# Patient Record
Sex: FEMALE | Race: WHITE | NOT HISPANIC OR LATINO | Employment: UNEMPLOYED | URBAN - METROPOLITAN AREA
[De-identification: names, ages, dates, MRNs, and addresses within clinical notes are randomized per-mention and may not be internally consistent; named-entity substitution may affect disease eponyms.]

---
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
## 2020-01-06 ENCOUNTER — OFFICE VISIT (OUTPATIENT)
Dept: OBSTETRICS AND GYNECOLOGY | Facility: CLINIC | Age: 20
End: 2020-01-06

## 2020-01-06 VITALS
BODY MASS INDEX: 25.67 KG/M2 | DIASTOLIC BLOOD PRESSURE: 80 MMHG | WEIGHT: 154.1 LBS | SYSTOLIC BLOOD PRESSURE: 120 MMHG | HEIGHT: 65 IN

## 2020-01-06 DIAGNOSIS — Z01.419 ENCOUNTER FOR GYNECOLOGICAL EXAMINATION: Primary | ICD-10-CM

## 2020-01-06 DIAGNOSIS — Z23 NEED FOR HPV VACCINATION: ICD-10-CM

## 2020-01-06 DIAGNOSIS — Z13.9 SCREENING FOR CONDITION: ICD-10-CM

## 2020-01-06 DIAGNOSIS — K62.89 ANAL OR RECTAL PAIN: ICD-10-CM

## 2020-01-06 PROBLEM — Z30.011 ORAL CONTRACEPTION INITIAL PRESCRIPTION: Status: RESOLVED | Noted: 2017-06-05 | Resolved: 2020-01-06

## 2020-01-06 LAB
BILIRUB BLD-MCNC: NEGATIVE MG/DL
CLARITY, POC: CLEAR
COLOR UR: YELLOW
GLUCOSE UR STRIP-MCNC: NEGATIVE MG/DL
KETONES UR QL: NEGATIVE
LEUKOCYTE EST, POC: NEGATIVE
NITRITE UR-MCNC: NEGATIVE MG/ML
PH UR: 5 [PH] (ref 5–8)
PROT UR STRIP-MCNC: NEGATIVE MG/DL
RBC # UR STRIP: NEGATIVE /UL
SP GR UR: 1 (ref 1–1.03)
UROBILINOGEN UR QL: NORMAL

## 2020-01-06 PROCEDURE — 81002 URINALYSIS NONAUTO W/O SCOPE: CPT | Performed by: OBSTETRICS & GYNECOLOGY

## 2020-01-06 PROCEDURE — 99395 PREV VISIT EST AGE 18-39: CPT | Performed by: OBSTETRICS & GYNECOLOGY

## 2020-01-06 PROCEDURE — 90471 IMMUNIZATION ADMIN: CPT | Performed by: OBSTETRICS & GYNECOLOGY

## 2020-01-06 PROCEDURE — 90651 9VHPV VACCINE 2/3 DOSE IM: CPT | Performed by: OBSTETRICS & GYNECOLOGY

## 2020-01-06 RX ORDER — LEVONORGESTREL AND ETHINYL ESTRADIOL 0.1-0.02MG
1 KIT ORAL DAILY
Qty: 84 TABLET | Refills: 3 | Status: SHIPPED | OUTPATIENT
Start: 2020-01-06 | End: 2021-01-28 | Stop reason: SDUPTHER

## 2020-01-06 NOTE — PROGRESS NOTES
GYN Annual Exam     CC- Here for annual exam.     Maryam Alvarez is a 19 y.o. female who presents for annual well woman exam. Periods are regular every 28-30 days, lasting 5 days. Dysmenorrhea:none. Cyclic symptoms include none. No intermenstrual bleeding, spotting, or discharge.  Patient is sexually active  no . Patient is satisfied with her contraception yes - OCPs. Started for irregualr menstrual cycles that have now resolved. Pt compalning of rectal pain that she noticed 2 weeks ago. The pain has improved but she notices a bump in the area that is tender. Denies constipation. No BRBPR    OB History    None           Current contraception: OCP (estrogen/progesterone)  History of abnormal Pap smear: no  History of abnormal mammogram: no  Family history of uterine, colon or ovarian cancer: no  Family history of breast cancer: no    Health Maintenance   Topic Date Due   • ANNUAL PHYSICAL  10/13/2003   • HPV VACCINES (1 - Female 2-dose series) 10/13/2011   • TDAP/TD VACCINES (1 - Tdap) 10/13/2011   • CHLAMYDIA SCREENING  05/18/2017   • INFLUENZA VACCINE  08/01/2019   • MENINGOCOCCAL VACCINE (Normal Risk)  Completed       History reviewed. No pertinent past medical history.    History reviewed. No pertinent surgical history.      Current Outpatient Medications:   •  hydrocortisone (ANUSOL-HC) 2.5 % rectal cream, Apply rectally 2 times daily x 7 days, Disp: 30 g, Rfl: 0  •  levonorgestrel-ethinyl estradiol (LARISSIA) 0.1-20 MG-MCG per tablet, Take 1 tablet by mouth Daily., Disp: 84 tablet, Rfl: 3  No current facility-administered medications for this visit.     No Known Allergies    Social History     Tobacco Use   • Smoking status: Never Smoker   • Smokeless tobacco: Never Used   Substance Use Topics   • Alcohol use: No   • Drug use: No       Family History   Problem Relation Age of Onset   • No Known Problems Mother    • No Known Problems Father    • Multiple sclerosis Maternal Grandmother    • Meniere's disease  "Maternal Grandmother    • No Known Problems Maternal Grandfather    • No Known Problems Paternal Grandmother    • No Known Problems Paternal Grandfather        Review of Systems   Constitutional: Negative for appetite change, chills, fatigue, fever and unexpected weight change.   Gastrointestinal: Negative for abdominal distention, abdominal pain, anal bleeding, blood in stool, constipation, diarrhea, nausea and vomiting.   Genitourinary: Negative for dyspareunia, dysuria, menstrual problem, pelvic pain, vaginal bleeding, vaginal discharge and vaginal pain.       /80   Ht 165.1 cm (65\")   Wt 69.9 kg (154 lb 1.6 oz)   LMP 12/30/2019 (Exact Date)   Breastfeeding No   BMI 25.64 kg/m²     Physical Exam   Constitutional: She is oriented to person, place, and time. She appears well-developed and well-nourished.   HENT:   Mouth/Throat: Normal dentition. No dental caries.   Cardiovascular: Normal rate, regular rhythm and normal heart sounds.   Pulmonary/Chest: Effort normal and breath sounds normal. No stridor. No respiratory distress. She has no wheezes.   Abdominal: Soft. She exhibits no distension and no mass. There is no tenderness.   Genitourinary: Rectal exam shows no external hemorrhoid, no fissure and no tenderness.   Musculoskeletal: Normal range of motion. She exhibits no edema or tenderness.   Neurological: She is alert and oriented to person, place, and time. No cranial nerve deficit. Coordination normal.   Skin: Skin is warm. No rash noted. No erythema. No pallor.   Psychiatric: She has a normal mood and affect. Her behavior is normal. Judgment and thought content normal.   Vitals reviewed.         Assessment/Plan    1) GYN HM: check pap age 21. gardisil #1 today. FU for Gardisil 32 in 2 months.  2) STD screening: never had intercourse  3) Irregular menstrual cycles: resolved on OCps- refills given.  4) anal lesion/pain: no evidence of fissure or hemorrhoid. Anal skin tag only. Rx Anusol cream to " apply to anus bid x 7 days.  5) Diet and Exercise discussed  6) Smoking Status: nonsmoker  7) Social: pt's mother is also my pt.  8) Follow up prn and 1 year       Diagnoses and all orders for this visit:    Encounter for gynecological examination    Screening for condition  -     POC Urinalysis Dipstick    Need for HPV vaccination  -     HPV 9-Valent Recomb Vaccine suspension 0.5 mL    Anal or rectal pain    Other orders  -     hydrocortisone (ANUSOL-HC) 2.5 % rectal cream; Apply rectally 2 times daily x 7 days  -     levonorgestrel-ethinyl estradiol (LARISSIA) 0.1-20 MG-MCG per tablet; Take 1 tablet by mouth Daily.  -     Cancel: HPV Vaccine QuadriValent 3 Dose IM          Hermila Bangura DO  1/6/2020  6:51 PM

## 2020-08-20 ENCOUNTER — TELEPHONE (OUTPATIENT)
Dept: OBSTETRICS AND GYNECOLOGY | Facility: CLINIC | Age: 20
End: 2020-08-20

## 2020-08-20 RX ORDER — FLUCONAZOLE 150 MG/1
150 TABLET ORAL DAILY
Qty: 1 TABLET | Refills: 0 | Status: SHIPPED | OUTPATIENT
Start: 2020-08-20 | End: 2021-01-28

## 2020-08-20 NOTE — TELEPHONE ENCOUNTER
PT HAD AN APPOINTMENT TODAY TO GET TREATMENT FOR A YEAST INFECTION AND SHE WOKE UP NOT FEELING WELL AND DOESN'T THINK IT WOULD BE A GOOD IDEA TO COME INTO THE OFFICE SHE WANTS TO KNOW IF YOU COULD SEND IN A DIFLUCAN FOR HER?

## 2021-01-28 ENCOUNTER — OFFICE VISIT (OUTPATIENT)
Dept: OBSTETRICS AND GYNECOLOGY | Facility: CLINIC | Age: 21
End: 2021-01-28

## 2021-01-28 VITALS
SYSTOLIC BLOOD PRESSURE: 122 MMHG | HEIGHT: 64 IN | BODY MASS INDEX: 28.34 KG/M2 | DIASTOLIC BLOOD PRESSURE: 76 MMHG | WEIGHT: 166 LBS

## 2021-01-28 DIAGNOSIS — Z13.9 SCREENING FOR CONDITION: ICD-10-CM

## 2021-01-28 DIAGNOSIS — Z23 NEED FOR HPV VACCINE: ICD-10-CM

## 2021-01-28 DIAGNOSIS — N89.8 VAGINAL DISCHARGE: ICD-10-CM

## 2021-01-28 DIAGNOSIS — Z11.3 SCREENING EXAMINATION FOR STD (SEXUALLY TRANSMITTED DISEASE): ICD-10-CM

## 2021-01-28 DIAGNOSIS — Z01.419 ENCOUNTER FOR GYNECOLOGICAL EXAMINATION: Primary | ICD-10-CM

## 2021-01-28 LAB
B-HCG UR QL: NEGATIVE
BILIRUB BLD-MCNC: NEGATIVE MG/DL
CLARITY, POC: CLEAR
COLOR UR: YELLOW
GLUCOSE UR STRIP-MCNC: NEGATIVE MG/DL
INTERNAL NEGATIVE CONTROL: NEGATIVE
INTERNAL POSITIVE CONTROL: POSITIVE
KETONES UR QL: NEGATIVE
LEUKOCYTE EST, POC: NEGATIVE
Lab: NORMAL
NITRITE UR-MCNC: NEGATIVE MG/ML
PH UR: 6 [PH] (ref 5–8)
PROT UR STRIP-MCNC: NEGATIVE MG/DL
RBC # UR STRIP: ABNORMAL /UL
SP GR UR: 1.02 (ref 1–1.03)
UROBILINOGEN UR QL: NORMAL

## 2021-01-28 PROCEDURE — 90471 IMMUNIZATION ADMIN: CPT | Performed by: OBSTETRICS & GYNECOLOGY

## 2021-01-28 PROCEDURE — 90651 9VHPV VACCINE 2/3 DOSE IM: CPT | Performed by: OBSTETRICS & GYNECOLOGY

## 2021-01-28 PROCEDURE — 99213 OFFICE O/P EST LOW 20 MIN: CPT | Performed by: OBSTETRICS & GYNECOLOGY

## 2021-01-28 PROCEDURE — 81025 URINE PREGNANCY TEST: CPT | Performed by: OBSTETRICS & GYNECOLOGY

## 2021-01-28 PROCEDURE — 99395 PREV VISIT EST AGE 18-39: CPT | Performed by: OBSTETRICS & GYNECOLOGY

## 2021-01-28 PROCEDURE — 81002 URINALYSIS NONAUTO W/O SCOPE: CPT | Performed by: OBSTETRICS & GYNECOLOGY

## 2021-01-28 RX ORDER — FLUCONAZOLE 150 MG/1
150 TABLET ORAL DAILY
Qty: 1 TABLET | Refills: 0 | Status: SHIPPED | OUTPATIENT
Start: 2021-01-28 | End: 2021-06-14

## 2021-01-28 RX ORDER — LEVONORGESTREL AND ETHINYL ESTRADIOL 0.1-0.02MG
1 KIT ORAL DAILY
Qty: 84 TABLET | Refills: 3 | Status: SHIPPED | OUTPATIENT
Start: 2021-01-28 | End: 2021-03-18

## 2021-01-28 NOTE — PROGRESS NOTES
GYN Annual Exam     CC- Here for annual exam.     Maryam Alvarez is a 20 y.o. female who presents for annual well woman exam. Pt complaining of vaginal irritation. She took Diflucan and sx improved but now symptoms back for 3 months. Yellowish vaginal discharge. Pt  Reports pruitis on the outside of her vagina. Pt reports she has tried to have sex with her boyfriend and it burns. She has tried a lubricant but it does not help. The burning is deep in the vagina and not at introitus.  Pt on OCps for contraception. Cycles are regular and lasting 5 days. Her boyfriend has never had sex prior to her.    OB History    No obstetric history on file.         Current contraception: OCP (estrogen/progesterone)  History of abnormal Pap smear: no  History of abnormal mammogram: no  Family history of uterine, colon or ovarian cancer: no  Family history of breast cancer: no    Health Maintenance   Topic Date Due   • ANNUAL PHYSICAL  10/13/2003   • HEPATITIS C SCREENING  05/18/2017   • CHLAMYDIA SCREENING  05/18/2017   • TDAP/TD VACCINES (1 - Tdap) 10/13/2019   • HPV VACCINES (2 - 3-dose series) 02/03/2020   • INFLUENZA VACCINE  08/01/2020   • MENINGOCOCCAL VACCINE  Completed   • Pneumococcal Vaccine 0-64  Aged Out       History reviewed. No pertinent past medical history.    History reviewed. No pertinent surgical history.      Current Outpatient Medications:   •  levonorgestrel-ethinyl estradiol (Larissia) 0.1-20 MG-MCG per tablet, Take 1 tablet by mouth Daily., Disp: 84 tablet, Rfl: 3  •  fluconazole (Diflucan) 150 MG tablet, Take 1 tablet by mouth Daily., Disp: 1 tablet, Rfl: 0  No current facility-administered medications for this visit.     No Known Allergies    Social History     Tobacco Use   • Smoking status: Never Smoker   • Smokeless tobacco: Never Used   Substance Use Topics   • Alcohol use: No   • Drug use: No       Family History   Problem Relation Age of Onset   • No Known Problems Mother    • No Known Problems Father   "  • Multiple sclerosis Maternal Grandmother    • Meniere's disease Maternal Grandmother    • No Known Problems Maternal Grandfather    • No Known Problems Paternal Grandmother    • No Known Problems Paternal Grandfather        Review of Systems   Constitutional: Negative for appetite change, chills, fatigue, fever and unexpected weight change.   Gastrointestinal: Negative for abdominal distention, abdominal pain, anal bleeding, blood in stool, constipation, diarrhea, nausea and vomiting.   Genitourinary: Negative for dyspareunia, dysuria, menstrual problem, pelvic pain, vaginal bleeding, vaginal discharge and vaginal pain.       /76   Ht 162.6 cm (64\")   Wt 75.3 kg (166 lb)   LMP 01/27/2021   BMI 28.49 kg/m²     Physical Exam  Vitals signs reviewed.   Constitutional:       General: She is not in acute distress.     Appearance: She is well-developed. She is not diaphoretic.   HENT:      Mouth/Throat:      Dentition: Normal dentition. No dental caries.   Cardiovascular:      Rate and Rhythm: Normal rate and regular rhythm.      Heart sounds: Normal heart sounds.   Pulmonary:      Effort: Pulmonary effort is normal. No respiratory distress.      Breath sounds: Normal breath sounds. No stridor. No wheezing.   Chest:      Breasts:         Right: No inverted nipple, mass, nipple discharge, skin change or tenderness.         Left: No inverted nipple, mass, nipple discharge, skin change or tenderness.   Abdominal:      General: There is no distension.      Palpations: Abdomen is soft. There is no mass.      Tenderness: There is no abdominal tenderness.   Genitourinary:     Labia:         Right: No rash, tenderness, lesion or injury.         Left: No rash, tenderness, lesion or injury.       Vagina: No signs of injury and foreign body. No vaginal discharge, erythema, tenderness or bleeding.      Cervix: No cervical motion tenderness, discharge or friability.      Uterus: Not deviated, not enlarged, not fixed and " not tender.       Adnexa:         Right: No mass, tenderness or fullness.          Left: No mass, tenderness or fullness.     Musculoskeletal: Normal range of motion.         General: No tenderness.   Skin:     General: Skin is warm.      Coloration: Skin is not pale.      Findings: No erythema or rash.   Neurological:      Mental Status: She is alert and oriented to person, place, and time.      Cranial Nerves: No cranial nerve deficit.      Coordination: Coordination normal.   Psychiatric:         Behavior: Behavior normal.         Thought Content: Thought content normal.         Judgment: Judgment normal.            Assessment/Plan    1) GYN HM: Check pap smear at age 21. SBE demonstrated and encouraged. S/p Gardisil #1. Pt desires to complete series. Gardisil #2 today.  2) STD screening: Check GCCT. Declines other STDs  3) Contraception: OCPs- refills given  4) Vulvovaginitis: Rx Diflucan. Check culture.  5) Diet and Exercise discussed  6) Smoking Status: nonsmoker  7) Social: pt planning on going to college but not sure what she wants to study.  8) Follow up prn and 1 year       Diagnoses and all orders for this visit:    Encounter for gynecological examination    Screening for condition  -     POC Urinalysis Dipstick  -     POC Pregnancy, Urine    Screening examination for STD (sexually transmitted disease)  -     Cancel: Chlamydia trachomatis, Neisseria gonorrhoeae, Trichomonas vaginalis, PCR - Urine, Urine, Random Void    Need for HPV vaccine  -     HPV 9-Valent Recomb Vaccine suspension 0.5 mL    Vaginal discharge  -     NuSwab VG+ - Swab, Vagina  -     Genital Mycoplasmas KHADAR, Swab - Swab, Vagina    Other orders  -     levonorgestrel-ethinyl estradiol (Larissia) 0.1-20 MG-MCG per tablet; Take 1 tablet by mouth Daily.  -     fluconazole (Diflucan) 150 MG tablet; Take 1 tablet by mouth Daily.          Hermila Bangura, DO  1/28/2021  13:39 EST

## 2021-02-03 LAB
A VAGINAE DNA VAG QL NAA+PROBE: NORMAL SCORE
BVAB2 DNA VAG QL NAA+PROBE: NORMAL SCORE
C ALBICANS DNA VAG QL NAA+PROBE: NEGATIVE
C GLABRATA DNA VAG QL NAA+PROBE: NEGATIVE
C TRACH DNA VAG QL NAA+PROBE: NEGATIVE
M GENITALIUM DNA SPEC QL NAA+PROBE: NEGATIVE
M HOMINIS DNA SPEC QL NAA+PROBE: NEGATIVE
MEGA1 DNA VAG QL NAA+PROBE: NORMAL SCORE
N GONORRHOEA DNA VAG QL NAA+PROBE: NEGATIVE
T VAGINALIS DNA VAG QL NAA+PROBE: NEGATIVE
UREAPLASMA DNA SPEC QL NAA+PROBE: POSITIVE

## 2021-02-03 RX ORDER — AZITHROMYCIN 500 MG/1
1000 TABLET, FILM COATED ORAL DAILY
Qty: 2 TABLET | Refills: 0 | Status: SHIPPED | OUTPATIENT
Start: 2021-02-03 | End: 2021-02-04

## 2021-03-15 ENCOUNTER — TELEPHONE (OUTPATIENT)
Dept: OBSTETRICS AND GYNECOLOGY | Facility: CLINIC | Age: 21
End: 2021-03-15

## 2021-03-15 NOTE — TELEPHONE ENCOUNTER
PATIENT IS HAVING BREAKTHROUGH BLEEDING AND STATES IT'S HEAVY. SHE IS WANTIING TO SEE IF THAT'S NORMAL OR IF SHE SHOULD COME IN AND DISCUSS IT WITH YOU?

## 2021-03-16 NOTE — TELEPHONE ENCOUNTER
She can keep taking the pill daily... the bleeding should stop. Or I can put her on a higher dose pill

## 2021-03-18 RX ORDER — LEVONORGESTREL AND ETHINYL ESTRADIOL 0.15-0.03
1 KIT ORAL DAILY
Qty: 84 TABLET | Refills: 3 | Status: SHIPPED | OUTPATIENT
Start: 2021-03-18 | End: 2021-06-14

## 2021-03-18 NOTE — TELEPHONE ENCOUNTER
I called in the higher dosage. She should finish her current pill and then start the new pack of higher dosage.

## 2021-03-19 ENCOUNTER — TELEPHONE (OUTPATIENT)
Dept: OBSTETRICS AND GYNECOLOGY | Facility: CLINIC | Age: 21
End: 2021-03-19

## 2021-03-19 NOTE — TELEPHONE ENCOUNTER
Patient called in stating she was having heavy bleeding in between periods and you started her on ocp's. She wants to know if she skipped the placebo and started new pack if that would be ok? Please advise

## 2021-04-12 ENCOUNTER — IMMUNIZATION (OUTPATIENT)
Dept: VACCINE CLINIC | Facility: HOSPITAL | Age: 21
End: 2021-04-12

## 2021-04-12 PROCEDURE — 0001A: CPT | Performed by: OBSTETRICS & GYNECOLOGY

## 2021-04-12 PROCEDURE — 91300 HC SARSCOV02 VAC 30MCG/0.3ML IM: CPT | Performed by: OBSTETRICS & GYNECOLOGY

## 2021-04-15 ENCOUNTER — APPOINTMENT (OUTPATIENT)
Dept: VACCINE CLINIC | Facility: HOSPITAL | Age: 21
End: 2021-04-15

## 2021-05-03 ENCOUNTER — IMMUNIZATION (OUTPATIENT)
Dept: VACCINE CLINIC | Facility: HOSPITAL | Age: 21
End: 2021-05-03

## 2021-05-03 PROCEDURE — 91300 HC SARSCOV02 VAC 30MCG/0.3ML IM: CPT | Performed by: OBSTETRICS & GYNECOLOGY

## 2021-05-03 PROCEDURE — 0002A: CPT | Performed by: OBSTETRICS & GYNECOLOGY

## 2021-05-24 ENCOUNTER — CLINICAL SUPPORT (OUTPATIENT)
Dept: OBSTETRICS AND GYNECOLOGY | Facility: CLINIC | Age: 21
End: 2021-05-24

## 2021-05-24 DIAGNOSIS — Z23 NEED FOR HPV VACCINE: Primary | ICD-10-CM

## 2021-05-24 PROCEDURE — 90651 9VHPV VACCINE 2/3 DOSE IM: CPT | Performed by: OBSTETRICS & GYNECOLOGY

## 2021-05-24 PROCEDURE — 90471 IMMUNIZATION ADMIN: CPT | Performed by: OBSTETRICS & GYNECOLOGY

## 2021-06-14 ENCOUNTER — OFFICE VISIT (OUTPATIENT)
Dept: OBSTETRICS AND GYNECOLOGY | Facility: CLINIC | Age: 21
End: 2021-06-14

## 2021-06-14 ENCOUNTER — TELEPHONE (OUTPATIENT)
Dept: OBSTETRICS AND GYNECOLOGY | Facility: CLINIC | Age: 21
End: 2021-06-14

## 2021-06-14 VITALS
WEIGHT: 157 LBS | HEIGHT: 64 IN | DIASTOLIC BLOOD PRESSURE: 72 MMHG | SYSTOLIC BLOOD PRESSURE: 130 MMHG | BODY MASS INDEX: 26.8 KG/M2

## 2021-06-14 DIAGNOSIS — N89.8 VAGINAL ITCHING: ICD-10-CM

## 2021-06-14 DIAGNOSIS — N76.0 ACUTE VAGINITIS: Primary | ICD-10-CM

## 2021-06-14 DIAGNOSIS — Z13.89 SCREENING FOR GENITOURINARY CONDITION: ICD-10-CM

## 2021-06-14 PROCEDURE — 99213 OFFICE O/P EST LOW 20 MIN: CPT | Performed by: NURSE PRACTITIONER

## 2021-06-14 RX ORDER — AZITHROMYCIN 250 MG/1
TABLET, FILM COATED ORAL
Qty: 6 TABLET | Refills: 0 | Status: SHIPPED | OUTPATIENT
Start: 2021-06-14 | End: 2022-04-11

## 2021-06-14 RX ORDER — LEVONORGESTREL AND ETHINYL ESTRADIOL 0.1-0.02MG
1 KIT ORAL DAILY
COMMUNITY
Start: 2021-05-16 | End: 2022-03-23

## 2021-06-14 NOTE — PROGRESS NOTES
"Subjective     Chief Complaint   Patient presents with   • Vaginal Itching     reddness, irritation       Maryam Alvarez is a 20 y.o. No obstetric history on file. whose LMP is Patient's last menstrual period was 05/15/2021.. She presents today with complaints of vaginal irritation, itching and redness. Her symptoms started approx 1 month ago. She has not tried any OTC medications. Earlier this year in January she had ureaplasma and she feels like that she has the exact same symptoms.  She reports a yellowish discharge. She is not sexually active.  This problem is new to me, the examiner.   HPI    HPI    The following portions of the patient's history were reviewed and updated as appropriate:vital signs, allergies, current medications, past medical history, past social history, past surgical history and problem list      Review of Systems     Review of Systems   Constitutional: Negative.    Gastrointestinal: Negative.    Genitourinary: Positive for vaginal discharge and vaginal pain.   Musculoskeletal: Negative.    Skin: Negative.    Psychiatric/Behavioral: Negative.        Objective      /72   Ht 162.6 cm (64\")   Wt 71.2 kg (157 lb)   LMP 05/15/2021   BMI 26.95 kg/m²     Physical Exam    Physical Exam  Vitals reviewed.   Constitutional:       Appearance: She is well-developed.   Abdominal:      Palpations: Abdomen is soft.   Genitourinary:     Exam position: Supine.      Labia:         Right: No rash, tenderness, lesion or injury.         Left: No rash, tenderness, lesion or injury.       Vagina: No signs of injury and foreign body. Vaginal discharge present. No erythema, tenderness or bleeding.   Skin:     General: Skin is warm and dry.   Neurological:      General: No focal deficit present.      Mental Status: She is alert and oriented to person, place, and time.   Psychiatric:         Mood and Affect: Mood normal.         Behavior: Behavior normal.         Lab Review   Labs: Urinalysis - with micro "     Imaging   No data reviewed    Assessment  Diagnoses and all orders for this visit:    1. Screening for genitourinary condition (Primary)  -     POC Urinalysis Dipstick        Additional Assessment:   1. Vaginitis    Plan     1. Vaginitis- Suspect ureaplasma again as pt had similar complaints with confirmed NuSwab in 1/21. Plan to treat presumptively for ureaplasma. She understands treatment before her swab results may necessitate an alternative treatment in the near future. Disc vag  Health. Enc vag probiotics daily.   2. Scheduled for: STD Labs - Nu Swab - myco, yeast, bv, leuk , Ultrasound of the -  N/A , Mammography - N/A , Bone Density Test - N/A , Additional Labs - N/A  3. STD:  Enc condom use.   4. Smoking status: non smoker  5.   BMI: Patient's Body mass index is 26.95 kg/m².      RTO PRN     Kristie Maya, APRN  6/14/2021

## 2021-07-07 ENCOUNTER — TELEPHONE (OUTPATIENT)
Dept: OBSTETRICS AND GYNECOLOGY | Facility: CLINIC | Age: 21
End: 2021-07-07

## 2021-07-07 NOTE — TELEPHONE ENCOUNTER
Patient calling states she saw Kristie back on 6/14 and tested positive for ureaplasma. States she is still having some vaginal irration and would like to know if you would call something in for her?

## 2021-07-08 RX ORDER — DOXYCYCLINE 100 MG/1
100 TABLET ORAL 2 TIMES DAILY
Qty: 14 TABLET | Refills: 0 | Status: SHIPPED | OUTPATIENT
Start: 2021-07-08 | End: 2021-07-15

## 2021-07-08 NOTE — TELEPHONE ENCOUNTER
I sent in meds. But let her know that if she still has symptoms that she needs to come in and get swabbed.

## 2021-07-08 NOTE — TELEPHONE ENCOUNTER
Patient calling states she cant really afford to come to come back in and is sure she has the ureaplasma would like to know if you will just call in another round of antibiotics for her?

## 2021-10-23 ENCOUNTER — HOSPITAL ENCOUNTER (EMERGENCY)
Facility: HOSPITAL | Age: 21
Discharge: HOME | End: 2021-10-23
Attending: EMERGENCY MEDICINE | Admitting: EMERGENCY MEDICINE

## 2021-10-23 VITALS
DIASTOLIC BLOOD PRESSURE: 77 MMHG | HEART RATE: 90 BPM | BODY MASS INDEX: 28.28 KG/M2 | WEIGHT: 176 LBS | HEIGHT: 66 IN | OXYGEN SATURATION: 98 % | SYSTOLIC BLOOD PRESSURE: 119 MMHG | RESPIRATION RATE: 18 BRPM | TEMPERATURE: 97.9 F

## 2021-10-23 DIAGNOSIS — R00.0 TACHYCARDIA: Primary | ICD-10-CM

## 2021-10-23 LAB
ALBUMIN SERPL-MCNC: 4.3 G/DL (ref 3.5–5.2)
ALBUMIN/GLOB SERPL: 1.3 G/DL
ALP SERPL-CCNC: 68 U/L (ref 39–117)
ALT SERPL W P-5'-P-CCNC: 17 U/L (ref 1–33)
ANION GAP SERPL CALCULATED.3IONS-SCNC: 9.1 MMOL/L (ref 5–15)
AST SERPL-CCNC: 25 U/L (ref 1–32)
BASOPHILS # BLD AUTO: 0.05 10*3/MM3 (ref 0–0.2)
BASOPHILS NFR BLD AUTO: 0.8 % (ref 0–1.5)
BILIRUB SERPL-MCNC: 0.2 MG/DL (ref 0–1.2)
BUN SERPL-MCNC: 10 MG/DL (ref 6–20)
BUN/CREAT SERPL: 13.5 (ref 7–25)
CALCIUM SPEC-SCNC: 9.3 MG/DL (ref 8.6–10.5)
CHLORIDE SERPL-SCNC: 104 MMOL/L (ref 98–107)
CO2 SERPL-SCNC: 24.9 MMOL/L (ref 22–29)
CREAT SERPL-MCNC: 0.74 MG/DL (ref 0.57–1)
D DIMER PPP FEU-MCNC: 0.33 MCGFEU/ML (ref 0–0.46)
DEPRECATED RDW RBC AUTO: 39.1 FL (ref 37–54)
EOSINOPHIL # BLD AUTO: 0.17 10*3/MM3 (ref 0–0.4)
EOSINOPHIL NFR BLD AUTO: 2.7 % (ref 0.3–6.2)
ERYTHROCYTE [DISTWIDTH] IN BLOOD BY AUTOMATED COUNT: 12.2 % (ref 12.3–15.4)
GFR SERPL CREATININE-BSD FRML MDRD: 99 ML/MIN/1.73
GLOBULIN UR ELPH-MCNC: 3.2 GM/DL
GLUCOSE SERPL-MCNC: 112 MG/DL (ref 65–99)
HCT VFR BLD AUTO: 38.8 % (ref 34–46.6)
HGB BLD-MCNC: 13 G/DL (ref 12–15.9)
IMM GRANULOCYTES # BLD AUTO: 0.01 10*3/MM3 (ref 0–0.05)
IMM GRANULOCYTES NFR BLD AUTO: 0.2 % (ref 0–0.5)
LYMPHOCYTES # BLD AUTO: 1.79 10*3/MM3 (ref 0.7–3.1)
LYMPHOCYTES NFR BLD AUTO: 28.9 % (ref 19.6–45.3)
MCH RBC QN AUTO: 29.4 PG (ref 26.6–33)
MCHC RBC AUTO-ENTMCNC: 33.5 G/DL (ref 31.5–35.7)
MCV RBC AUTO: 87.8 FL (ref 79–97)
MONOCYTES # BLD AUTO: 0.4 10*3/MM3 (ref 0.1–0.9)
MONOCYTES NFR BLD AUTO: 6.5 % (ref 5–12)
NEUTROPHILS NFR BLD AUTO: 3.77 10*3/MM3 (ref 1.7–7)
NEUTROPHILS NFR BLD AUTO: 60.9 % (ref 42.7–76)
NRBC BLD AUTO-RTO: 0 /100 WBC (ref 0–0.2)
PLATELET # BLD AUTO: 210 10*3/MM3 (ref 140–450)
PMV BLD AUTO: 10.9 FL (ref 6–12)
POTASSIUM SERPL-SCNC: 3.7 MMOL/L (ref 3.5–5.2)
PROT SERPL-MCNC: 7.5 G/DL (ref 6–8.5)
RBC # BLD AUTO: 4.42 10*6/MM3 (ref 3.77–5.28)
SODIUM SERPL-SCNC: 138 MMOL/L (ref 136–145)
TROPONIN T SERPL-MCNC: <0.01 NG/ML (ref 0–0.03)
WBC # BLD AUTO: 6.19 10*3/MM3 (ref 3.4–10.8)

## 2021-10-23 PROCEDURE — 99283 EMERGENCY DEPT VISIT LOW MDM: CPT

## 2021-10-23 PROCEDURE — 85025 COMPLETE CBC W/AUTO DIFF WBC: CPT

## 2021-10-23 PROCEDURE — 93010 ELECTROCARDIOGRAM REPORT: CPT | Performed by: INTERNAL MEDICINE

## 2021-10-23 PROCEDURE — 93005 ELECTROCARDIOGRAM TRACING: CPT | Performed by: EMERGENCY MEDICINE

## 2021-10-23 PROCEDURE — 85379 FIBRIN DEGRADATION QUANT: CPT

## 2021-10-23 PROCEDURE — 84484 ASSAY OF TROPONIN QUANT: CPT

## 2021-10-23 PROCEDURE — 80053 COMPREHEN METABOLIC PANEL: CPT

## 2021-10-23 RX ORDER — SODIUM CHLORIDE 0.9 % (FLUSH) 0.9 %
10 SYRINGE (ML) INJECTION AS NEEDED
Status: DISCONTINUED | OUTPATIENT
Start: 2021-10-23 | End: 2021-10-24 | Stop reason: HOSPADM

## 2021-10-23 RX ADMIN — SODIUM CHLORIDE 1000 ML: 9 INJECTION, SOLUTION INTRAVENOUS at 21:51

## 2021-10-24 LAB — QT INTERVAL: 330 MS

## 2021-10-24 NOTE — ED PROVIDER NOTES
EMERGENCY DEPARTMENT ENCOUNTER      Room Number: 08/08    History is provided by the patient, no translation services needed    HPI:    Chief complaint: Chest pain    Location: Left side    Quality/Severity: Patient describes the pain is a burning pain that feels like heartburn.  She rates the pain a 2 out of 10.    Timing/Duration: 4 days    Modifying Factors: Patient states that she took Pepcid earlier this date and did not notice the pain for several hours after taking it.    Associated Symptoms: None    Narrative: Pt is a 21 y.o. female who presents complaining of left-sided chest pain x4 days.  She was seen at urgent care and sent to the hospital for further evaluation.  Patient is on oral contraceptives.  She describes the pain as a burning pain that feels like heartburn.  She rates the pain a 2 out of 10 at this time.  Patient states that she took Pepcid earlier this date and did not notice the pain for several hours after taking it.  Patient denies nausea, vomiting, diarrhea, shortness of breath, back pain, neck pain, flank pain, dysuria, abdominal pain, or extremity pain.      PMD: Provider, No Known    REVIEW OF SYSTEMS  Review of Systems   Constitutional: Negative for chills, fatigue and fever.   HENT: Negative for congestion and rhinorrhea.    Eyes: Negative for pain and visual disturbance.   Respiratory: Negative for cough, chest tightness and shortness of breath.    Cardiovascular: Positive for chest pain (left sided; burning chest pain). Negative for leg swelling.   Gastrointestinal: Negative for abdominal pain, constipation, diarrhea, nausea and vomiting.   Genitourinary: Negative for dysuria and flank pain.   Musculoskeletal: Negative for arthralgias, back pain, gait problem, myalgias and neck pain.   Skin: Negative for color change, pallor, rash and wound.   Neurological: Negative for dizziness, syncope, light-headedness and headaches.   Psychiatric/Behavioral: Negative for confusion and suicidal  ideas. The patient is not nervous/anxious.          PAST MEDICAL HISTORY  Active Ambulatory Problems     Diagnosis Date Noted   • Oligomenorrhea 05/19/2017   • Sorethroat 10/23/2017   • Fever 10/23/2017   • Headache 10/23/2017   • Acute vaginitis 06/14/2021     Resolved Ambulatory Problems     Diagnosis Date Noted   • Oral contraception initial prescription 06/05/2017   • Congestion of both ears 07/19/2017     No Additional Past Medical History       PAST SURGICAL HISTORY  History reviewed. No pertinent surgical history.    FAMILY HISTORY  Family History   Problem Relation Age of Onset   • No Known Problems Mother    • No Known Problems Father    • Multiple sclerosis Maternal Grandmother    • Meniere's disease Maternal Grandmother    • No Known Problems Maternal Grandfather    • No Known Problems Paternal Grandmother    • No Known Problems Paternal Grandfather        SOCIAL HISTORY  Social History     Socioeconomic History   • Marital status: Single   Tobacco Use   • Smoking status: Never Smoker   • Smokeless tobacco: Never Used   Substance and Sexual Activity   • Alcohol use: No   • Drug use: No   • Sexual activity: Yes     Partners: Male     Birth control/protection: OCP       ALLERGIES  Patient has no known allergies.      Current Facility-Administered Medications:   •  [COMPLETED] Insert peripheral IV, , , Once **AND** sodium chloride 0.9 % flush 10 mL, 10 mL, Intravenous, PRN, Aparna Cervantes PA-C    Current Outpatient Medications:   •  azithromycin (Zithromax Z-Vaughn) 250 MG tablet, Take 2 tablets by mouth on day 1, then 1 tablet daily on days 2-5, Disp: 6 tablet, Rfl: 0  •  hydrocortisone 2.5 % ointment, APPLY TO AFFECTED AREA OF EXTREMITIES TWICE A DAY AS NEEDED, Disp: , Rfl:   •  Larissia 0.1-20 MG-MCG per tablet, Take 1 tablet by mouth Daily., Disp: , Rfl:     PHYSICAL EXAM  ED Triage Vitals [10/23/21 2127]   Temp Heart Rate Resp BP SpO2   97.9 °F (36.6 °C) (!) 125 20 139/91 100 %      Temp src Heart Rate  Source Patient Position BP Location FiO2 (%)   Oral Monitor Sitting Right arm --       Physical Exam  Vitals and nursing note reviewed.   Constitutional:       General: She is not in acute distress.     Appearance: She is well-developed. She is not ill-appearing, toxic-appearing or diaphoretic.   HENT:      Head: Normocephalic and atraumatic.   Eyes:      Conjunctiva/sclera: Conjunctivae normal.   Cardiovascular:      Rate and Rhythm: Regular rhythm. Tachycardia present.      Pulses:           Radial pulses are 2+ on the right side and 2+ on the left side.      Heart sounds: Normal heart sounds.   Pulmonary:      Effort: Pulmonary effort is normal. No tachypnea, accessory muscle usage or respiratory distress.      Breath sounds: Normal breath sounds. No decreased breath sounds, wheezing, rhonchi or rales.   Chest:      Chest wall: No tenderness.   Abdominal:      General: Bowel sounds are normal. There is no distension.      Palpations: Abdomen is soft.      Tenderness: There is no abdominal tenderness. There is no guarding or rebound.   Musculoskeletal:         General: Normal range of motion.      Cervical back: Normal range of motion and neck supple.      Right lower leg: No tenderness. No edema.      Left lower leg: No tenderness. No edema.   Skin:     General: Skin is warm and dry.      Coloration: Skin is not cyanotic or pale.      Findings: No ecchymosis, erythema or rash.      Nails: There is no clubbing.   Neurological:      General: No focal deficit present.      Mental Status: She is alert and oriented to person, place, and time.   Psychiatric:         Mood and Affect: Mood and affect normal.         Behavior: Behavior normal.           LAB RESULTS  Lab Results (last 24 hours)     Procedure Component Value Units Date/Time    CBC & Differential [[id number]]  (Abnormal) Collected: 10/23/21 2144    Specimen: Blood Updated: 10/23/21 2151    Narrative:      The following orders were created for panel order CBC  & Differential.  Procedure                               Abnormality         Status                     ---------                               -----------         ------                     CBC Auto Differential[[id number]]        Abnormal            Final result                 Please view results for these tests on the individual orders.    Comprehensive Metabolic Panel [[id number]]  (Abnormal) Collected: 10/23/21 2144    Specimen: Blood Updated: 10/23/21 2210     Glucose 112 mg/dL      BUN 10 mg/dL      Creatinine 0.74 mg/dL      Sodium 138 mmol/L      Potassium 3.7 mmol/L      Comment: Slight hemolysis detected by analyzer. Results may be affected.        Chloride 104 mmol/L      CO2 24.9 mmol/L      Calcium 9.3 mg/dL      Total Protein 7.5 g/dL      Albumin 4.30 g/dL      ALT (SGPT) 17 U/L      AST (SGOT) 25 U/L      Alkaline Phosphatase 68 U/L      Total Bilirubin 0.2 mg/dL      eGFR Non African Amer 99 mL/min/1.73      Globulin 3.2 gm/dL      A/G Ratio 1.3 g/dL      BUN/Creatinine Ratio 13.5     Anion Gap 9.1 mmol/L     Narrative:      GFR Normal >60  Chronic Kidney Disease <60  Kidney Failure <15      D-dimer, Quantitative [[id number]]  (Normal) Collected: 10/23/21 2144    Specimen: Blood Updated: 10/23/21 2203     D-Dimer, Quantitative 0.33 MCGFEU/mL     Narrative:      Can be elevated in, but is not diagnostic for deep vein thrombosis (DVT) or pulmonary embolis (PE).  It is also elevated in other medical conditions.  Clinical correlation is required.  The negative cut-off value for the D-Dimer is 0.50 mcg FEU/mL for DVT and PE.      Troponin [[id number]]  (Normal) Collected: 10/23/21 2144    Specimen: Blood Updated: 10/23/21 2212     Troponin T <0.010 ng/mL     Narrative:      Troponin T Reference Range:  <= 0.03 ng/mL-   Negative for AMI  >0.03 ng/mL-     Abnormal for myocardial necrosis.  Clinicians would have to utilize clinical acumen, EKG, Troponin and serial changes to determine if it is an Acute  Myocardial Infarction or myocardial injury due to an underlying chronic condition.       Results may be falsely decreased if patient taking Biotin.      CBC Auto Differential [[id number]]  (Abnormal) Collected: 10/23/21 2144    Specimen: Blood Updated: 10/23/21 2151     WBC 6.19 10*3/mm3      RBC 4.42 10*6/mm3      Hemoglobin 13.0 g/dL      Hematocrit 38.8 %      MCV 87.8 fL      MCH 29.4 pg      MCHC 33.5 g/dL      RDW 12.2 %      RDW-SD 39.1 fl      MPV 10.9 fL      Platelets 210 10*3/mm3      Neutrophil % 60.9 %      Lymphocyte % 28.9 %      Monocyte % 6.5 %      Eosinophil % 2.7 %      Basophil % 0.8 %      Immature Grans % 0.2 %      Neutrophils, Absolute 3.77 10*3/mm3      Lymphocytes, Absolute 1.79 10*3/mm3      Monocytes, Absolute 0.40 10*3/mm3      Eosinophils, Absolute 0.17 10*3/mm3      Basophils, Absolute 0.05 10*3/mm3      Immature Grans, Absolute 0.01 10*3/mm3      nRBC 0.0 /100 WBC             I ordered the above labs and reviewed the results    RADIOLOGY  No Radiology Exams Resulted Within Past 24 Hours      PROCEDURES  Procedures      PROGRESS AND CONSULTS  ED Course as of 10/23/21 2223   Sat Oct 23, 2021   2214 EKG         EKG time / Interpretation time: 2131/2211  Rhythm/Rate: Sinus tach, 115   WI: 151  QRS, axis: 31  QTc 456  ST and T waves: No acute ST segment changes or T wave abnormalities.  EKG Tracing Interpreted Contemporaneously by me, independently viewed by me and MD.     [AH]   2220 Pt HR improved after her father joined her in the room and fluid was given. Father advised that pt has anxiety and is untreated.  [AH]      ED Course User Index  [AH] Aparna Cervantes PA-C           MEDICAL DECISION MAKING    MDM     My differential diagnosis for chest pain includes but is not limited to:  Muscle strain, costochondritis, myositis, pleurisy, rib fracture, intercostal neuritis, herpes zoster, tumor, myocardial infarction, coronary syndrome, unstable angina, angina, aortic dissection, mitral  valve prolapse, pericarditis, palpitations, pulmonary embolus, pneumonia, pneumothorax, lung cancer, GERD, esophagitis, esophageal spasm  DIAGNOSIS  Final diagnoses:   Tachycardia       Latest Documented Vital Signs:  As of 22:23 EDT  BP- 139/91 HR- 98 Temp- 97.9 °F (36.6 °C) (Oral) O2 sat- 99%    DISPOSITION  Pt discharged      Discussed pertinent findings with the patient/family.  Patient/Family voiced understanding of need to follow-up for recheck and further testing as needed.  Return to the Emergency Department warnings were given.         Medication List      No changes were made to your prescriptions during this visit.              Follow-up Information     Provider, No Known. Call in 2 days.    Why: to schedule follow up  Contact information:  [facility] [address] [phone]                           Dictated utilizing [employer] dictation     Aparna Cervantes PA-C  10/23/21 4112

## 2022-03-23 RX ORDER — LEVONORGESTREL AND ETHINYL ESTRADIOL 0.1-0.02MG
KIT ORAL
Qty: 84 TABLET | Refills: 0 | Status: SHIPPED | OUTPATIENT
Start: 2022-03-23 | End: 2022-04-11 | Stop reason: SDUPTHER

## 2022-03-29 ENCOUNTER — TELEPHONE (OUTPATIENT)
Dept: INTERNAL MEDICINE | Facility: CLINIC | Age: 22
End: 2022-03-29

## 2022-03-29 NOTE — TELEPHONE ENCOUNTER
PATIENT'S MOTHER IS CALLING TO SEE A SLOT WITH ONE OF THE PCPS COULD BE OPENED FOR HER DAUGHTER TO BE TAKEN ON AS A NEW PATIENT. BOTH MOTHER AND FATHER OF PATIENT ARE SEEN IN THIS OFFICE. PLEASE ADVISE: [phone]

## 2022-04-11 ENCOUNTER — OFFICE VISIT (OUTPATIENT)
Dept: OBSTETRICS AND GYNECOLOGY | Facility: CLINIC | Age: 22
End: 2022-04-11

## 2022-04-11 VITALS
WEIGHT: 174 LBS | HEIGHT: 66 IN | SYSTOLIC BLOOD PRESSURE: 122 MMHG | DIASTOLIC BLOOD PRESSURE: 76 MMHG | BODY MASS INDEX: 27.97 KG/M2

## 2022-04-11 DIAGNOSIS — Z01.419 ROUTINE GYNECOLOGICAL EXAMINATION: Primary | ICD-10-CM

## 2022-04-11 DIAGNOSIS — Z01.419 PAP SMEAR, LOW-RISK: ICD-10-CM

## 2022-04-11 DIAGNOSIS — Z11.51 SPECIAL SCREENING EXAMINATION FOR HUMAN PAPILLOMAVIRUS (HPV): ICD-10-CM

## 2022-04-11 LAB
B-HCG UR QL: NEGATIVE
BILIRUB BLD-MCNC: NEGATIVE MG/DL
CLARITY, POC: CLEAR
COLOR UR: YELLOW
EXPIRATION DATE: NORMAL
GLUCOSE UR STRIP-MCNC: NEGATIVE MG/DL
INTERNAL NEGATIVE CONTROL: NEGATIVE
INTERNAL POSITIVE CONTROL: POSITIVE
KETONES UR QL: NEGATIVE
LEUKOCYTE EST, POC: NEGATIVE
Lab: NORMAL
NITRITE UR-MCNC: NEGATIVE MG/ML
PH UR: 5 [PH] (ref 5–8)
PROT UR STRIP-MCNC: NEGATIVE MG/DL
RBC # UR STRIP: NEGATIVE /UL
SP GR UR: 1.03 (ref 1–1.03)
UROBILINOGEN UR QL: NORMAL

## 2022-04-11 PROCEDURE — 99395 PREV VISIT EST AGE 18-39: CPT | Performed by: OBSTETRICS & GYNECOLOGY

## 2022-04-11 PROCEDURE — 81025 URINE PREGNANCY TEST: CPT | Performed by: OBSTETRICS & GYNECOLOGY

## 2022-04-11 PROCEDURE — 81002 URINALYSIS NONAUTO W/O SCOPE: CPT | Performed by: OBSTETRICS & GYNECOLOGY

## 2022-04-11 RX ORDER — LEVONORGESTREL AND ETHINYL ESTRADIOL 0.1-0.02MG
1 KIT ORAL DAILY
Qty: 84 TABLET | Refills: 3 | Status: SHIPPED | OUTPATIENT
Start: 2022-04-11

## 2022-04-11 NOTE — PROGRESS NOTES
GYN Annual Exam     CC- Here for annual exam.     Maryam Alvarez is a 21 y.o. female who presents for annual well woman exam. Periods are regular every 28-30 days, lasting 5-6 days. Dysmenorrhea:none. Cyclic symptoms include none. No intermenstrual bleeding, spotting, or discharge.  Patient is sexually active  yes - fiance . Patient is satisfied with her contraception yes - OCPs. Pt reporting burning with intercourse. She is using a lubricant and condoms.    OB History    No obstetric history on file.       Current contraception: OCP (estrogen/progesterone)  History of abnormal Pap smear: no  History of abnormal mammogram: no  Family history of uterine, colon or ovarian cancer: no  Family history of breast cancer: no    Health Maintenance   Topic Date Due   • ANNUAL PHYSICAL  Never done   • HEPATITIS C SCREENING  Never done   • PAP SMEAR  Never done   • TDAP/TD VACCINES (1 - Tdap) Never done   • Annual Gynecologic Pelvic and Breast Exam  01/29/2022   • CHLAMYDIA SCREENING  06/14/2022   • INFLUENZA VACCINE  08/01/2022   • COVID-19 Vaccine  Completed   • MENINGOCOCCAL VACCINE  Completed   • HPV VACCINES  Completed   • Pneumococcal Vaccine 0-64  Aged Out       History reviewed. No pertinent past medical history.    History reviewed. No pertinent surgical history.      Current Outpatient Medications:   •  levonorgestrel-ethinyl estradiol (Larissia) 0.1-20 MG-MCG per tablet, Take 1 tablet by mouth Daily., Disp: 84 tablet, Rfl: 3    No Known Allergies    Social History     Tobacco Use   • Smoking status: Never Smoker   • Smokeless tobacco: Never Used   Substance Use Topics   • Alcohol use: No   • Drug use: No       Family History   Problem Relation Age of Onset   • No Known Problems Mother    • No Known Problems Father    • Multiple sclerosis Maternal Grandmother    • Meniere's disease Maternal Grandmother    • No Known Problems Maternal Grandfather    • No Known Problems Paternal Grandmother    • No Known Problems  "Paternal Grandfather        Review of Systems    /76   Ht 167.6 cm (66\")   Wt 78.9 kg (174 lb)   LMP 03/28/2022   BMI 28.08 kg/m²     Physical Exam  Vitals reviewed.   Constitutional:       Appearance: She is well-developed.   HENT:      Mouth/Throat:      Dentition: Normal dentition. No dental caries.   Cardiovascular:      Rate and Rhythm: Normal rate and regular rhythm.      Heart sounds: Normal heart sounds.   Pulmonary:      Effort: Pulmonary effort is normal. No respiratory distress.      Breath sounds: Normal breath sounds. No stridor. No wheezing.   Chest:   Breasts:      Right: No inverted nipple, mass, nipple discharge, skin change or tenderness.      Left: No inverted nipple, mass, nipple discharge, skin change or tenderness.       Abdominal:      General: There is no distension.      Palpations: Abdomen is soft. There is no mass.      Tenderness: There is no abdominal tenderness.   Genitourinary:     Labia:         Right: No rash, tenderness or lesion.         Left: No rash, tenderness or lesion.       Vagina: No vaginal discharge, tenderness or bleeding.      Cervix: No cervical motion tenderness, discharge or friability.      Uterus: Not deviated, not enlarged, not fixed and not tender.       Adnexa:         Right: No mass, tenderness or fullness.          Left: No mass, tenderness or fullness.     Musculoskeletal:         General: No tenderness. Normal range of motion.   Skin:     General: Skin is warm.      Findings: No erythema or rash.   Neurological:      Mental Status: She is alert and oriented to person, place, and time.      Cranial Nerves: No cranial nerve deficit.      Coordination: Coordination normal.   Psychiatric:         Behavior: Behavior normal.         Thought Content: Thought content normal.         Judgment: Judgment normal.            Assessment/Plan      1) GYN HM: Check pap smear. SBE demonstrated and encouraged. S/p Gardisil x3  2) STD screening: Check GCCT. Declines " other STDs  3) Contraception: OCPs- refills given  4) Family Planning: Considering a child in 5 years.  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  8) Burning with intercourse: PE unremarkable. Discussed sx may be due to condoms. Pt is on OCPs.  9) Follow up prn and 1 year       Diagnoses and all orders for this visit:    Routine gynecological examination  -     POC Urinalysis Dipstick  -     POC Pregnancy, Urine  -     IGP,CtNgTv,rfx Aptima HPV ASCU    Pap smear, low-risk  -     IGP,CtNgTv,rfx Aptima HPV ASCU    Special screening examination for human papillomavirus (HPV)  -     IGP,CtNgTv,rfx Aptima HPV ASCU    Other orders  -     levonorgestrel-ethinyl estradiol (Larissia) 0.1-20 MG-MCG per tablet; Take 1 tablet by mouth Daily.          Hermila Bangura DO  4/11/2022  15:24 EDT

## 2022-04-13 ENCOUNTER — OFFICE VISIT (OUTPATIENT)
Dept: INTERNAL MEDICINE | Facility: CLINIC | Age: 22
End: 2022-04-13

## 2022-04-13 VITALS
BODY MASS INDEX: 28.38 KG/M2 | DIASTOLIC BLOOD PRESSURE: 82 MMHG | HEART RATE: 108 BPM | HEIGHT: 66 IN | SYSTOLIC BLOOD PRESSURE: 118 MMHG | OXYGEN SATURATION: 98 % | WEIGHT: 176.6 LBS | RESPIRATION RATE: 18 BRPM | TEMPERATURE: 97.6 F

## 2022-04-13 DIAGNOSIS — R59.0 CERVICAL LYMPHADENOPATHY: Primary | ICD-10-CM

## 2022-04-13 PROCEDURE — 99204 OFFICE O/P NEW MOD 45 MIN: CPT | Performed by: INTERNAL MEDICINE

## 2022-04-13 NOTE — PROGRESS NOTES
"Chief Complaint  Lump on left side of neck  (Not painful x 2 months )    Subjective          Maryam Alvarez presents to [facility]  Lump on right side of neck, first noticed 2 months ago; no fevers, no weight loss, no night sweats; noticed this on her right clavicle area and behind her right ear; did get covid booster just before this; seen by another MD had normal labs; no changes in size    Taking OCPs for metrorrhagia; no heavy bleeding  No surgeries  Family medical issues  Lives with parents  Works at [employer]  No smoking, no issues with alcohol or illicits  Sexually active with fiancee only      Objective   Vital Signs:   /82   Pulse 108   Temp 97.6 °F (36.4 °C)   Resp 18   Ht 167.6 cm (66\")   Wt 80.1 kg (176 lb 9.6 oz)   SpO2 98%   BMI 28.50 kg/m²            Physical Exam  Vitals and nursing note reviewed.   Constitutional:       Appearance: Normal appearance.   HENT:      Head: Normocephalic and atraumatic.      Right Ear: Tympanic membrane, ear canal and external ear normal.      Left Ear: Tympanic membrane, ear canal and external ear normal.      Nose: Nose normal.      Mouth/Throat:      Mouth: Mucous membranes are moist.      Pharynx: Oropharynx is clear.   Eyes:      Extraocular Movements: Extraocular movements intact.      Conjunctiva/sclera: Conjunctivae normal.      Pupils: Pupils are equal, round, and reactive to light.   Cardiovascular:      Rate and Rhythm: Normal rate and regular rhythm.      Pulses: Normal pulses.      Heart sounds: Normal heart sounds.   Pulmonary:      Effort: Pulmonary effort is normal.      Breath sounds: Normal breath sounds. No wheezing, rhonchi or rales.   Abdominal:      General: Abdomen is flat. There is no distension.      Palpations: Abdomen is soft.      Tenderness: There is no abdominal tenderness.   Musculoskeletal:         General: Normal range of motion.      Cervical back: Normal range of " motion and neck supple.      Right lower leg: No edema.      Left lower leg: No edema.      Comments: Very small <0.5cm inferior right cervical LAD, no others in neck, groin, axilla   Skin:     General: Skin is warm and dry.      Capillary Refill: Capillary refill takes less than 2 seconds.      Findings: No rash.   Neurological:      General: No focal deficit present.      Mental Status: She is alert and oriented to person, place, and time. Mental status is at baseline.   Psychiatric:         Mood and Affect: Mood normal.         Behavior: Behavior normal.        Result Review :                 Assessment and Plan    Diagnoses and all orders for this visit:    1. Cervical lymphadenopathy (Primary)  -     CBC w AUTO Differential  -     Comprehensive metabolic panel  -     Hepatitis C Antibody  -     US Head Neck Soft Tissue    - check labs and u/s as above; discussed low risk exam  - will follow closely  - rtc wosrening, change in illness  - exam supervised by MA today      Follow Up   Return in about 6 weeks (around 5/25/2022) for Recheck.  Patient was given instructions and counseling regarding her condition or for health maintenance advice. Please see specific information pulled into the AVS if appropriate.

## 2022-04-14 LAB
ALBUMIN SERPL-MCNC: 4.8 G/DL (ref 3.9–5)
ALBUMIN/GLOB SERPL: 1.9 {RATIO} (ref 1.2–2.2)
ALP SERPL-CCNC: 65 IU/L (ref 44–121)
ALT SERPL-CCNC: 11 IU/L (ref 0–32)
AST SERPL-CCNC: 23 IU/L (ref 0–40)
BASOPHILS # BLD AUTO: 0 X10E3/UL (ref 0–0.2)
BASOPHILS NFR BLD AUTO: 1 %
BILIRUB SERPL-MCNC: <0.2 MG/DL (ref 0–1.2)
BUN SERPL-MCNC: 9 MG/DL (ref 6–20)
BUN/CREAT SERPL: 14 (ref 9–23)
C TRACH RRNA CVX QL NAA+PROBE: NEGATIVE
CALCIUM SERPL-MCNC: 9.5 MG/DL (ref 8.7–10.2)
CHLORIDE SERPL-SCNC: 101 MMOL/L (ref 96–106)
CO2 SERPL-SCNC: 21 MMOL/L (ref 20–29)
CONV .: NORMAL
CREAT SERPL-MCNC: 0.64 MG/DL (ref 0.57–1)
CYTOLOGIST CVX/VAG CYTO: NORMAL
CYTOLOGY CVX/VAG DOC CYTO: NORMAL
CYTOLOGY CVX/VAG DOC THIN PREP: NORMAL
DX ICD CODE: NORMAL
EGFRCR SERPLBLD CKD-EPI 2021: 129 ML/MIN/1.73
EOSINOPHIL # BLD AUTO: 0.1 X10E3/UL (ref 0–0.4)
EOSINOPHIL NFR BLD AUTO: 2 %
ERYTHROCYTE [DISTWIDTH] IN BLOOD BY AUTOMATED COUNT: 12.2 % (ref 11.7–15.4)
GLOBULIN SER CALC-MCNC: 2.5 G/DL (ref 1.5–4.5)
GLUCOSE SERPL-MCNC: 81 MG/DL (ref 65–99)
HCT VFR BLD AUTO: 41.6 % (ref 34–46.6)
HCV AB S/CO SERPL IA: <0.1 S/CO RATIO (ref 0–0.9)
HGB BLD-MCNC: 13.6 G/DL (ref 11.1–15.9)
HIV 1 & 2 AB SER-IMP: NORMAL
IMM GRANULOCYTES # BLD AUTO: 0 X10E3/UL (ref 0–0.1)
IMM GRANULOCYTES NFR BLD AUTO: 0 %
LYMPHOCYTES # BLD AUTO: 1.5 X10E3/UL (ref 0.7–3.1)
LYMPHOCYTES NFR BLD AUTO: 26 %
MCH RBC QN AUTO: 29.2 PG (ref 26.6–33)
MCHC RBC AUTO-ENTMCNC: 32.7 G/DL (ref 31.5–35.7)
MCV RBC AUTO: 90 FL (ref 79–97)
MONOCYTES # BLD AUTO: 0.3 X10E3/UL (ref 0.1–0.9)
MONOCYTES NFR BLD AUTO: 5 %
N GONORRHOEA RRNA CVX QL NAA+PROBE: NEGATIVE
NEUTROPHILS # BLD AUTO: 4 X10E3/UL (ref 1.4–7)
NEUTROPHILS NFR BLD AUTO: 66 %
OTHER STN SPEC: NORMAL
PLATELET # BLD AUTO: 220 X10E3/UL (ref 150–450)
POTASSIUM SERPL-SCNC: 4.3 MMOL/L (ref 3.5–5.2)
PROT SERPL-MCNC: 7.3 G/DL (ref 6–8.5)
RBC # BLD AUTO: 4.65 X10E6/UL (ref 3.77–5.28)
SODIUM SERPL-SCNC: 138 MMOL/L (ref 134–144)
STAT OF ADQ CVX/VAG CYTO-IMP: NORMAL
T VAGINALIS RRNA SPEC QL NAA+PROBE: NEGATIVE
WBC # BLD AUTO: 6 X10E3/UL (ref 3.4–10.8)

## 2022-04-26 ENCOUNTER — HOSPITAL ENCOUNTER (OUTPATIENT)
Dept: ULTRASOUND IMAGING | Facility: HOSPITAL | Age: 22
Discharge: HOME | End: 2022-04-26
Admitting: INTERNAL MEDICINE

## 2022-04-26 PROCEDURE — 76536 US EXAM OF HEAD AND NECK: CPT

## 2022-04-27 ENCOUNTER — TELEPHONE (OUTPATIENT)
Dept: INTERNAL MEDICINE | Facility: CLINIC | Age: 22
End: 2022-04-27

## 2022-04-27 NOTE — TELEPHONE ENCOUNTER
She does have some nonspecific lymph node swelling in that area of concern.  It still could be a viral illness like mono causing the lymph node swelling.  Probably have her come in to reevaluate and let see if we need to do any additional imaging like CT scan.  If she is feeling better I think she can wait until Dr. Manning comes back

## 2022-04-27 NOTE — TELEPHONE ENCOUNTER
Caller: Maryam Alvarez    Relationship to patient: Self    Best call back number: [phone]    Patient is needing: PATIENT IS CALLING IN FOR RESULTS FROM ULTRASOUND SHE HAD OF HER NECK. PLEASE ADVISE.

## 2022-05-02 ENCOUNTER — OFFICE VISIT (OUTPATIENT)
Dept: INTERNAL MEDICINE | Facility: CLINIC | Age: 22
End: 2022-05-02

## 2022-05-02 ENCOUNTER — TELEPHONE (OUTPATIENT)
Dept: INTERNAL MEDICINE | Facility: CLINIC | Age: 22
End: 2022-05-02

## 2022-05-02 VITALS
HEIGHT: 66 IN | DIASTOLIC BLOOD PRESSURE: 86 MMHG | TEMPERATURE: 99 F | SYSTOLIC BLOOD PRESSURE: 122 MMHG | OXYGEN SATURATION: 99 % | BODY MASS INDEX: 28.22 KG/M2 | RESPIRATION RATE: 18 BRPM | HEART RATE: 108 BPM | WEIGHT: 175.6 LBS

## 2022-05-02 DIAGNOSIS — Z11.4 ENCOUNTER FOR SCREENING FOR HIV: ICD-10-CM

## 2022-05-02 DIAGNOSIS — Z00.00 WELL ADULT EXAM: ICD-10-CM

## 2022-05-02 DIAGNOSIS — R59.0 SUPRACLAVICULAR LYMPHADENOPATHY: Primary | ICD-10-CM

## 2022-05-02 DIAGNOSIS — Z11.59 ENCOUNTER FOR HCV SCREENING TEST FOR LOW RISK PATIENT: ICD-10-CM

## 2022-05-02 PROCEDURE — 90715 TDAP VACCINE 7 YRS/> IM: CPT | Performed by: INTERNAL MEDICINE

## 2022-05-02 PROCEDURE — 90471 IMMUNIZATION ADMIN: CPT | Performed by: INTERNAL MEDICINE

## 2022-05-02 PROCEDURE — 99395 PREV VISIT EST AGE 18-39: CPT | Performed by: INTERNAL MEDICINE

## 2022-05-02 NOTE — TELEPHONE ENCOUNTER
Ok, is she on the patients list of people we can talk with? Can you ask the patient if she is ok if we talk with her mother about this

## 2022-05-02 NOTE — TELEPHONE ENCOUNTER
Caller: Jeana Alvarez    Relationship: Mother    Best call back number: [phone]    What is the best time to reach you: ANY    Who are you requesting to speak with (clinical staff, provider,  specific staff member): CLINICAL STAFF    Do you know the name of the person who called: MOTHER    What was the call regarding:PATIENT WOULD LIKE TO TALK ABOUT TODAYS VISIT.  SHE HAS MANY QUESTIONS.    Do you require a callback: YES

## 2022-05-02 NOTE — PROGRESS NOTES
"Chief Complaint   Patient presents with   • Swollen Glands     US follow up        Subjective   Maryam Alvarez is a 21 y.o. female.     History of Present Illness     The following portions of the patient's history were reviewed and updated as appropriate: allergies, current medications, past family history, past medical history, past social history, past surgical history, and problem list.    Review of Systems      Objective   Body mass index is 28.34 kg/m².   Vitals:    05/02/22 1313   BP: 122/86   Pulse: 108   Resp: 18   Temp: 99 °F (37.2 °C)   SpO2: 99%   Weight: 79.7 kg (175 lb 9.6 oz)   Height: 167.6 cm (66\")        Physical Exam  Vitals and nursing note reviewed.   Constitutional:       Appearance: Normal appearance.   HENT:      Head: Normocephalic and atraumatic.      Right Ear: Tympanic membrane, ear canal and external ear normal.      Left Ear: Tympanic membrane, ear canal and external ear normal.      Nose: Nose normal.      Mouth/Throat:      Mouth: Mucous membranes are moist.      Pharynx: Oropharynx is clear.   Eyes:      Extraocular Movements: Extraocular movements intact.      Conjunctiva/sclera: Conjunctivae normal.      Pupils: Pupils are equal, round, and reactive to light.   Cardiovascular:      Rate and Rhythm: Normal rate and regular rhythm.      Pulses: Normal pulses.      Heart sounds: Normal heart sounds.   Pulmonary:      Effort: Pulmonary effort is normal.      Breath sounds: Normal breath sounds. No wheezing, rhonchi or rales.   Abdominal:      General: Abdomen is flat. There is no distension.      Palpations: Abdomen is soft.      Tenderness: There is no abdominal tenderness.   Musculoskeletal:         General: Normal range of motion.      Cervical back: Normal range of motion and neck supple.      Right lower leg: No edema.      Left lower leg: No edema.   Skin:     General: Skin is warm and dry.      Capillary Refill: Capillary refill takes less than 2 seconds.      Findings: No " rash.   Neurological:      General: No focal deficit present.      Mental Status: She is alert and oriented to person, place, and time. Mental status is at baseline.   Psychiatric:         Mood and Affect: Mood normal.         Behavior: Behavior normal.           Current Outpatient Medications:   •  levonorgestrel-ethinyl estradiol (Larissia) 0.1-20 MG-MCG per tablet, Take 1 tablet by mouth Daily., Disp: 84 tablet, Rfl: 3     Lab Results   Component Value Date    TSH 2.64 05/24/2017        Health Maintenance   Topic Date Due   • PAP SMEAR  Never done   • TDAP/TD VACCINES (1 - Tdap) Never done   • INFLUENZA VACCINE  08/01/2022   • CHLAMYDIA SCREENING  04/11/2023        Immunization History   Administered Date(s) Administered   • COVID-19 (PFIZER) PURPLE CAP 04/12/2021, 05/03/2021   • Covid-19 (Pfizer) Gray Cap 02/14/2022   • Hpv9 01/06/2020, 01/28/2021, 05/24/2021   • Meningococcal MCV4P (Menactra) 08/02/2018         Assessment/Plan   Diagnoses and all orders for this visit:    1. Supraclavicular lymphadenopathy (Primary)  -     Ambulatory Referral to ENT (Otolaryngology)    2. Well adult exam  -     Comprehensive Metabolic Panel  -     Lipid Panel  -     Hemoglobin A1c  -     CBC & Differential    3. Encounter for screening for HIV  -     HIV-1 / O / 2 Ag / Antibody 4th Generation    4. Encounter for HCV screening test for low risk patient  -     Hepatitis C Antibody    Other orders  -     Tdap Vaccine Greater Than or Equal To 8yo IM    - referral to ENT today, discussed node size, location; consider biopsy pending the evaluation by ENT; labs have looked ok         Well adult exam  - labs checked and evaluated    PAP - at 21, normal with GYN  Mammogram - at 40  Colonoscopy - at 45  Glaucoma - yearly  AAA - na  Lung cancer - na  DXA - cat 65  HIV - checking  HCV - checking  DM - checking  HLD - checking  Smoking - no  Depression - no, [id number]  Vaccines - getting records  Falls - no  Alcohol Screening -  no    Discussed mental health, sexual health, substance use, abuse, anticipatory guidance given.      No follow-ups on file.     Ibrahima Manning MD  [facility]  Internal Medicine and Pediatrics  Phone: [phone]  Fax: [phone]

## 2022-05-03 LAB
ALBUMIN SERPL-MCNC: 4.7 G/DL (ref 3.9–5)
ALBUMIN/GLOB SERPL: 1.7 {RATIO} (ref 1.2–2.2)
ALP SERPL-CCNC: 64 IU/L (ref 44–121)
ALT SERPL-CCNC: 13 IU/L (ref 0–32)
AST SERPL-CCNC: 26 IU/L (ref 0–40)
BASOPHILS # BLD AUTO: 0.1 X10E3/UL (ref 0–0.2)
BASOPHILS NFR BLD AUTO: 1 %
BILIRUB SERPL-MCNC: 0.3 MG/DL (ref 0–1.2)
BUN SERPL-MCNC: 11 MG/DL (ref 6–20)
BUN/CREAT SERPL: 17 (ref 9–23)
CALCIUM SERPL-MCNC: 9.7 MG/DL (ref 8.7–10.2)
CHLORIDE SERPL-SCNC: 103 MMOL/L (ref 96–106)
CHOLEST SERPL-MCNC: 202 MG/DL (ref 100–199)
CO2 SERPL-SCNC: 23 MMOL/L (ref 20–29)
CREAT SERPL-MCNC: 0.64 MG/DL (ref 0.57–1)
EGFRCR SERPLBLD CKD-EPI 2021: 129 ML/MIN/1.73
EOSINOPHIL # BLD AUTO: 0.1 X10E3/UL (ref 0–0.4)
EOSINOPHIL NFR BLD AUTO: 2 %
ERYTHROCYTE [DISTWIDTH] IN BLOOD BY AUTOMATED COUNT: 12 % (ref 11.7–15.4)
GLOBULIN SER CALC-MCNC: 2.8 G/DL (ref 1.5–4.5)
GLUCOSE SERPL-MCNC: 97 MG/DL (ref 65–99)
HBA1C MFR BLD: 4.7 % (ref 4.8–5.6)
HCT VFR BLD AUTO: 39.6 % (ref 34–46.6)
HCV AB S/CO SERPL IA: <0.1 S/CO RATIO (ref 0–0.9)
HDLC SERPL-MCNC: 72 MG/DL
HGB BLD-MCNC: 13.5 G/DL (ref 11.1–15.9)
HIV 1+2 AB+HIV1 P24 AG SERPL QL IA: NON REACTIVE
IMM GRANULOCYTES # BLD AUTO: 0 X10E3/UL (ref 0–0.1)
IMM GRANULOCYTES NFR BLD AUTO: 0 %
LDLC SERPL CALC-MCNC: 121 MG/DL (ref 0–99)
LYMPHOCYTES # BLD AUTO: 1.4 X10E3/UL (ref 0.7–3.1)
LYMPHOCYTES NFR BLD AUTO: 21 %
MCH RBC QN AUTO: 29.9 PG (ref 26.6–33)
MCHC RBC AUTO-ENTMCNC: 34.1 G/DL (ref 31.5–35.7)
MCV RBC AUTO: 88 FL (ref 79–97)
MONOCYTES # BLD AUTO: 0.3 X10E3/UL (ref 0.1–0.9)
MONOCYTES NFR BLD AUTO: 4 %
NEUTROPHILS # BLD AUTO: 5 X10E3/UL (ref 1.4–7)
NEUTROPHILS NFR BLD AUTO: 72 %
PLATELET # BLD AUTO: 203 X10E3/UL (ref 150–450)
POTASSIUM SERPL-SCNC: 4.2 MMOL/L (ref 3.5–5.2)
PROT SERPL-MCNC: 7.5 G/DL (ref 6–8.5)
RBC # BLD AUTO: 4.52 X10E6/UL (ref 3.77–5.28)
SODIUM SERPL-SCNC: 139 MMOL/L (ref 134–144)
TRIGL SERPL-MCNC: 50 MG/DL (ref 0–149)
VLDLC SERPL CALC-MCNC: 9 MG/DL (ref 5–40)
WBC # BLD AUTO: 6.9 X10E3/UL (ref 3.4–10.8)

## 2022-07-18 ENCOUNTER — OFFICE VISIT (OUTPATIENT)
Dept: INTERNAL MEDICINE | Facility: CLINIC | Age: 22
End: 2022-07-18

## 2022-07-18 VITALS
HEIGHT: 66 IN | SYSTOLIC BLOOD PRESSURE: 130 MMHG | DIASTOLIC BLOOD PRESSURE: 64 MMHG | OXYGEN SATURATION: 99 % | HEART RATE: 127 BPM | WEIGHT: 177 LBS | TEMPERATURE: 98.4 F | BODY MASS INDEX: 28.45 KG/M2

## 2022-07-18 DIAGNOSIS — F33.9 RECURRENT MAJOR DEPRESSIVE DISORDER, REMISSION STATUS UNSPECIFIED: ICD-10-CM

## 2022-07-18 DIAGNOSIS — F41.1 GAD (GENERALIZED ANXIETY DISORDER): Primary | ICD-10-CM

## 2022-07-18 DIAGNOSIS — R59.0 CERVICAL LYMPHADENOPATHY: ICD-10-CM

## 2022-07-18 PROCEDURE — 99214 OFFICE O/P EST MOD 30 MIN: CPT | Performed by: INTERNAL MEDICINE

## 2022-07-18 RX ORDER — FLUOXETINE HYDROCHLORIDE 20 MG/1
20 CAPSULE ORAL DAILY
Qty: 30 CAPSULE | Refills: 3 | Status: SHIPPED | OUTPATIENT
Start: 2022-07-18 | End: 2022-08-30

## 2022-07-18 NOTE — PROGRESS NOTES
"Chief Complaint  Follow-up (Swollen lymph nodes) and Anxiety    Subjective        Maryam Alvarez presents to [facility]  Lymph nodes, no changes since last visit, not getting larger, not bothering her    Increased anxiety for years though has worsened the last few months; mood is ok, feels happy, enjoys doing things, no issues with hurting self, others, suicide; no particular triggers; has had some issues with feeling of panic in the past; does have significant family history of anxiety      Objective   Vital Signs:  /64 (BP Location: Right arm, Patient Position: Sitting, Cuff Size: Large Adult)   Pulse (!) 127   Temp 98.4 °F (36.9 °C) (Temporal)   Ht 167.6 cm (66\")   Wt 80.3 kg (177 lb)   SpO2 99%   BMI 28.57 kg/m²   Estimated body mass index is 28.57 kg/m² as calculated from the following:    Height as of this encounter: 167.6 cm (66\").    Weight as of this encounter: 80.3 kg (177 lb).          Physical Exam  Vitals and nursing note reviewed.   Constitutional:       Appearance: Normal appearance.   HENT:      Head: Normocephalic and atraumatic.      Right Ear: External ear normal.      Left Ear: External ear normal.      Nose: Nose normal.      Mouth/Throat:      Mouth: Mucous membranes are moist.      Pharynx: Oropharynx is clear.   Eyes:      Extraocular Movements: Extraocular movements intact.      Conjunctiva/sclera: Conjunctivae normal.   Pulmonary:      Effort: Pulmonary effort is normal. No respiratory distress.   Musculoskeletal:         General: Normal range of motion.      Cervical back: Normal range of motion.   Lymphadenopathy:      Cervical: No cervical adenopathy.   Skin:     Findings: No rash.   Neurological:      General: No focal deficit present.      Mental Status: She is alert. Mental status is at baseline.   Psychiatric:         Mood and Affect: Mood normal.         Behavior: Behavior normal.         Thought Content: Thought " content normal.         Judgment: Judgment normal.        Result Review :                Assessment and Plan   Diagnoses and all orders for this visit:    1. SUN (generalized anxiety disorder) (Primary)  -     FLUoxetine (PROzac) 20 MG capsule; Take 1 capsule by mouth Daily.  Dispense: 30 capsule; Refill: 3    2. Recurrent major depressive disorder, remission status unspecified (HCC)  -     FLUoxetine (PROzac) 20 MG capsule; Take 1 capsule by mouth Daily.  Dispense: 30 capsule; Refill: 3    3. Cervical lymphadenopathy    - discussed outside time, exercise, therapy  - start fluoxetine 20mg, discussed risks/benefits/alternatives  - continue to monitor lymph nodes, discussed ENT eval again today         Follow Up   Return in about 2 weeks (around 8/1/2022) for Recheck.  Patient was given instructions and counseling regarding her condition or for health maintenance advice. Please see specific information pulled into the AVS if appropriate.

## 2022-08-01 ENCOUNTER — OFFICE VISIT (OUTPATIENT)
Dept: INTERNAL MEDICINE | Facility: CLINIC | Age: 22
End: 2022-08-01

## 2022-08-01 VITALS
HEART RATE: 74 BPM | TEMPERATURE: 97.3 F | RESPIRATION RATE: 16 BRPM | OXYGEN SATURATION: 98 % | DIASTOLIC BLOOD PRESSURE: 78 MMHG | SYSTOLIC BLOOD PRESSURE: 126 MMHG | WEIGHT: 180 LBS | HEIGHT: 66 IN | BODY MASS INDEX: 28.93 KG/M2

## 2022-08-01 DIAGNOSIS — F33.9 RECURRENT MAJOR DEPRESSIVE DISORDER, REMISSION STATUS UNSPECIFIED: ICD-10-CM

## 2022-08-01 DIAGNOSIS — F41.1 GAD (GENERALIZED ANXIETY DISORDER): Primary | ICD-10-CM

## 2022-08-01 PROCEDURE — 99214 OFFICE O/P EST MOD 30 MIN: CPT | Performed by: INTERNAL MEDICINE

## 2022-08-01 NOTE — PROGRESS NOTES
"Chief Complaint  Anxiety (Follow up )    Subjective        Maryam Alvarez presents to [facility]  SUN, MDD, started fluoxetine 20mg last visit; did have some increased anxiety initially that is now improving; feels mood is a little better, less anxious; no thoughts of hurting self, others, suicide      Objective   Vital Signs:  /78   Pulse 74   Temp 97.3 °F (36.3 °C)   Resp 16   Ht 167.6 cm (66\")   Wt 81.6 kg (180 lb)   SpO2 98%   BMI 29.05 kg/m²   Estimated body mass index is 29.05 kg/m² as calculated from the following:    Height as of this encounter: 167.6 cm (66\").    Weight as of this encounter: 81.6 kg (180 lb).          Physical Exam  Vitals and nursing note reviewed.   Constitutional:       Appearance: Normal appearance.   HENT:      Head: Normocephalic and atraumatic.      Right Ear: External ear normal.      Left Ear: External ear normal.      Nose: Nose normal.      Mouth/Throat:      Mouth: Mucous membranes are moist.      Pharynx: Oropharynx is clear.   Eyes:      Extraocular Movements: Extraocular movements intact.      Conjunctiva/sclera: Conjunctivae normal.   Pulmonary:      Effort: Pulmonary effort is normal. No respiratory distress.   Musculoskeletal:         General: Normal range of motion.      Cervical back: Normal range of motion.   Skin:     Findings: No rash.   Neurological:      General: No focal deficit present.      Mental Status: She is alert. Mental status is at baseline.   Psychiatric:         Mood and Affect: Mood normal.         Behavior: Behavior normal.         Thought Content: Thought content normal.         Judgment: Judgment normal.        Result Review :                Assessment and Plan   Diagnoses and all orders for this visit:    1. SUN (generalized anxiety disorder) (Primary)    2. Recurrent major depressive disorder, remission status unspecified (HCC)    - continue fluoxetine 20mg daily, continue outside " time, exercise, therapy  - consider therapy, counseled again today, she has plans for this  - rtc 2-4 weeks         Follow Up   No follow-ups on file.  Patient was given instructions and counseling regarding her condition or for health maintenance advice. Please see specific information pulled into the AVS if appropriate.

## 2022-08-30 ENCOUNTER — OFFICE VISIT (OUTPATIENT)
Dept: INTERNAL MEDICINE | Facility: CLINIC | Age: 22
End: 2022-08-30

## 2022-08-30 VITALS
HEART RATE: 115 BPM | BODY MASS INDEX: 27.93 KG/M2 | DIASTOLIC BLOOD PRESSURE: 76 MMHG | TEMPERATURE: 97.7 F | WEIGHT: 173.8 LBS | OXYGEN SATURATION: 98 % | HEIGHT: 66 IN | SYSTOLIC BLOOD PRESSURE: 132 MMHG

## 2022-08-30 DIAGNOSIS — N63.12 MASS OF UPPER INNER QUADRANT OF RIGHT BREAST: ICD-10-CM

## 2022-08-30 DIAGNOSIS — F41.1 GAD (GENERALIZED ANXIETY DISORDER): Primary | ICD-10-CM

## 2022-08-30 DIAGNOSIS — F33.9 RECURRENT MAJOR DEPRESSIVE DISORDER, REMISSION STATUS UNSPECIFIED: ICD-10-CM

## 2022-08-30 PROCEDURE — 99214 OFFICE O/P EST MOD 30 MIN: CPT | Performed by: INTERNAL MEDICINE

## 2022-08-30 RX ORDER — FLUOXETINE HYDROCHLORIDE 40 MG/1
40 CAPSULE ORAL DAILY
Qty: 30 CAPSULE | Refills: 3 | Status: SHIPPED | OUTPATIENT
Start: 2022-08-30 | End: 2022-09-16 | Stop reason: SDUPTHER

## 2022-08-30 NOTE — PROGRESS NOTES
"Chief Complaint  f/u meds and lump rt breast    Subjective        Maryam Lilia Alvarez presents to [facility]  Here with right breast mass for last 3 days, firm, mobile, non tender, no nipple discharge; not sure if related to menstrual cycle as does not start for another week or so; no injury or trauma    Recently seen by ENT for lymphadenopathy, was told this is benign and this helped her anxiety and worrying a lot; does not feel the fluoxetine at 20mg is helping her much with her anxiety at this point as much as she would want; no thoughts of hurting self, others, suicide      Objective   Vital Signs:  /76   Pulse 115   Temp 97.7 °F (36.5 °C)   Ht 167.6 cm (66\")   Wt 78.8 kg (173 lb 12.8 oz)   SpO2 98%   BMI 28.05 kg/m²   Estimated body mass index is 28.05 kg/m² as calculated from the following:    Height as of this encounter: 167.6 cm (66\").    Weight as of this encounter: 78.8 kg (173 lb 12.8 oz).          Physical Exam  Vitals and nursing note reviewed.   Constitutional:       Appearance: Normal appearance.   HENT:      Head: Normocephalic and atraumatic.      Right Ear: External ear normal.      Left Ear: External ear normal.      Nose: Nose normal.      Mouth/Throat:      Mouth: Mucous membranes are moist.      Pharynx: Oropharynx is clear.   Eyes:      Extraocular Movements: Extraocular movements intact.      Conjunctiva/sclera: Conjunctivae normal.   Pulmonary:      Effort: Pulmonary effort is normal. No respiratory distress.   Musculoskeletal:         General: Normal range of motion.      Cervical back: Normal range of motion.      Comments: Right breast with small 1cm diameter mass, soft, mobile, mild ttp, normal left breast, no axillary lad   Skin:     Findings: No rash.   Neurological:      General: No focal deficit present.      Mental Status: She is alert. Mental status is at baseline.   Psychiatric:         Mood and Affect: Mood normal.       "   Behavior: Behavior normal.         Thought Content: Thought content normal.         Judgment: Judgment normal.        Result Review :                Assessment and Plan   Diagnoses and all orders for this visit:    1. SUN (generalized anxiety disorder) (Primary)    2. Recurrent major depressive disorder, remission status unspecified (HCC)    3. Mass of upper inner quadrant of right breast  -     Mammo Diagnostic Bilateral With CAD; Future  -     US Breast Right Complete; Future    Other orders  -     FLUoxetine (PROzac) 40 MG capsule; Take 1 capsule by mouth Daily.  Dispense: 30 capsule; Refill: 3      - increase fluoxetine to 40mg from 20mg  - discussed risks/benefits/alternatives of meds/treatments  - check mammogram, u/s; follow up pending above         Follow Up   No follow-ups on file.  Patient was given instructions and counseling regarding her condition or for health maintenance advice. Please see specific information pulled into the AVS if appropriate.

## 2022-09-14 ENCOUNTER — PATIENT MESSAGE (OUTPATIENT)
Dept: INTERNAL MEDICINE | Facility: CLINIC | Age: 22
End: 2022-09-14

## 2022-09-16 RX ORDER — FLUOXETINE HYDROCHLORIDE 40 MG/1
40 CAPSULE ORAL DAILY
Qty: 30 CAPSULE | Refills: 3 | Status: SHIPPED | OUTPATIENT
Start: 2022-09-16 | End: 2022-10-03

## 2022-09-16 NOTE — TELEPHONE ENCOUNTER
From: Maryam Alvarez  To: Ibrahima Manning MD  Sent: 9/14/2022 2:27 PM EDT  Subject: Fluoxetine    Hi, I just picked up my next fluoxetine prescription and it’s 20 mg instead of 40 mg. Can I just take 2 of the 20 mg for now, or will that mess with dosage? Thanks.

## 2022-09-19 ENCOUNTER — HOSPITAL ENCOUNTER (OUTPATIENT)
Dept: ULTRASOUND IMAGING | Facility: HOSPITAL | Age: 22
Discharge: HOME | End: 2022-09-19
Admitting: INTERNAL MEDICINE

## 2022-09-19 ENCOUNTER — HOSPITAL ENCOUNTER (OUTPATIENT)
Dept: MAMMOGRAPHY | Facility: HOSPITAL | Age: 22
End: 2022-09-19

## 2022-09-19 DIAGNOSIS — N63.12 MASS OF UPPER INNER QUADRANT OF RIGHT BREAST: ICD-10-CM

## 2022-09-19 PROCEDURE — 76642 ULTRASOUND BREAST LIMITED: CPT

## 2022-09-23 ENCOUNTER — TELEPHONE (OUTPATIENT)
Dept: SURGERY | Facility: CLINIC | Age: 22
End: 2022-09-23

## 2022-09-23 NOTE — TELEPHONE ENCOUNTER
Patient scheduled for consult on 11/4/2022 at 9:00am with Dr. London ZIMMERMAN voicemail was left for the patient to call and confirm    New patient packet was mailed to the patient 9/23/2022 SD

## 2022-10-03 ENCOUNTER — OFFICE VISIT (OUTPATIENT)
Dept: INTERNAL MEDICINE | Facility: CLINIC | Age: 22
End: 2022-10-03

## 2022-10-03 VITALS
BODY MASS INDEX: 28.45 KG/M2 | HEIGHT: 66 IN | OXYGEN SATURATION: 98 % | WEIGHT: 177 LBS | DIASTOLIC BLOOD PRESSURE: 70 MMHG | SYSTOLIC BLOOD PRESSURE: 124 MMHG | TEMPERATURE: 98 F | RESPIRATION RATE: 16 BRPM | HEART RATE: 80 BPM

## 2022-10-03 DIAGNOSIS — F33.9 RECURRENT MAJOR DEPRESSIVE DISORDER, REMISSION STATUS UNSPECIFIED: ICD-10-CM

## 2022-10-03 DIAGNOSIS — F41.1 GAD (GENERALIZED ANXIETY DISORDER): Primary | ICD-10-CM

## 2022-10-03 PROCEDURE — 99214 OFFICE O/P EST MOD 30 MIN: CPT | Performed by: INTERNAL MEDICINE

## 2022-10-03 RX ORDER — PAROXETINE HYDROCHLORIDE 40 MG/1
20 TABLET, FILM COATED ORAL EVERY MORNING
Qty: 30 TABLET | Refills: 3 | Status: SHIPPED | OUTPATIENT
Start: 2022-10-03 | End: 2022-10-11

## 2022-10-03 NOTE — PROGRESS NOTES
"Chief Complaint  Anxiety (Follow up /Discuss med change )    Subjective        Maryam Alvarez presents to [facility]  History of Present Illness  SUN, MDD, does not feel meds are working as well as they were before; not sure they are doing much now she states; no thoughts of hurting self, others, suicide      Objective   Vital Signs:  /70   Pulse 80   Temp 98 °F (36.7 °C)   Resp 16   Ht 167.6 cm (66\")   Wt 80.3 kg (177 lb)   SpO2 98%   BMI 28.57 kg/m²   Estimated body mass index is 28.57 kg/m² as calculated from the following:    Height as of this encounter: 167.6 cm (66\").    Weight as of this encounter: 80.3 kg (177 lb).          Physical Exam  Vitals and nursing note reviewed.   Constitutional:       Appearance: Normal appearance.   HENT:      Head: Normocephalic and atraumatic.      Right Ear: Tympanic membrane, ear canal and external ear normal.      Left Ear: Tympanic membrane, ear canal and external ear normal.      Nose: Nose normal.      Mouth/Throat:      Mouth: Mucous membranes are moist.      Pharynx: Oropharynx is clear.   Eyes:      Extraocular Movements: Extraocular movements intact.      Conjunctiva/sclera: Conjunctivae normal.      Pupils: Pupils are equal, round, and reactive to light.   Cardiovascular:      Rate and Rhythm: Normal rate and regular rhythm.      Pulses: Normal pulses.      Heart sounds: Normal heart sounds.   Pulmonary:      Effort: Pulmonary effort is normal.      Breath sounds: Normal breath sounds. No wheezing, rhonchi or rales.   Abdominal:      General: Abdomen is flat. There is no distension.      Palpations: Abdomen is soft.      Tenderness: There is no abdominal tenderness.   Musculoskeletal:         General: Normal range of motion.      Cervical back: Normal range of motion and neck supple.      Right lower leg: No edema.      Left lower leg: No edema.   Skin:     General: Skin is warm and dry.      Capillary " Refill: Capillary refill takes less than 2 seconds.      Findings: No rash.   Neurological:      General: No focal deficit present.      Mental Status: She is alert and oriented to person, place, and time. Mental status is at baseline.   Psychiatric:         Mood and Affect: Mood normal.         Behavior: Behavior normal.        Result Review :                Assessment and Plan   Diagnoses and all orders for this visit:    1. SUN (generalized anxiety disorder) (Primary)    2. Recurrent major depressive disorder, remission status unspecified (HCC)    Other orders  -     PARoxetine (Paxil) 40 MG tablet; Take 0.5 tablets by mouth Every Morning.  Dispense: 30 tablet; Refill: 3    - change to paroxetine, discussed tapering prior meds  - continue outside time, exericse, therapy  - rtc to follow up pending above         Follow Up   No follow-ups on file.  Patient was given instructions and counseling regarding her condition or for health maintenance advice. Please see specific information pulled into the AVS if appropriate.

## 2022-10-07 ENCOUNTER — PATIENT MESSAGE (OUTPATIENT)
Dept: INTERNAL MEDICINE | Facility: CLINIC | Age: 22
End: 2022-10-07

## 2022-10-11 RX ORDER — PAROXETINE HYDROCHLORIDE HEMIHYDRATE 25 MG/1
25 TABLET, FILM COATED, EXTENDED RELEASE ORAL EVERY MORNING
Qty: 30 TABLET | Refills: 3 | Status: SHIPPED | OUTPATIENT
Start: 2022-10-11 | End: 2023-02-06

## 2022-10-11 NOTE — TELEPHONE ENCOUNTER
From: Maryam Alvarez  To: Ibrahima Manning MD  Sent: 10/7/2022 1:17 PM EDT  Subject: Paroxetine    Hi there! You have me on paroxetine 20mg. I can already tell that this medicine is working so much better for me! I notice that it starts to fade away towards night time. My dad is on 37.5 mg extended release and I would like to try extended release also if that’s possible. Thank you for always being so helpful!

## 2022-10-17 ENCOUNTER — OFFICE VISIT (OUTPATIENT)
Dept: INTERNAL MEDICINE | Facility: CLINIC | Age: 22
End: 2022-10-17

## 2022-10-17 VITALS
HEART RATE: 99 BPM | HEIGHT: 66 IN | TEMPERATURE: 98.4 F | WEIGHT: 174 LBS | BODY MASS INDEX: 27.97 KG/M2 | RESPIRATION RATE: 18 BRPM | DIASTOLIC BLOOD PRESSURE: 70 MMHG | SYSTOLIC BLOOD PRESSURE: 116 MMHG | OXYGEN SATURATION: 99 %

## 2022-10-17 DIAGNOSIS — F33.9 RECURRENT MAJOR DEPRESSIVE DISORDER, REMISSION STATUS UNSPECIFIED: ICD-10-CM

## 2022-10-17 DIAGNOSIS — F41.1 GAD (GENERALIZED ANXIETY DISORDER): ICD-10-CM

## 2022-10-17 DIAGNOSIS — B96.89 BACTERIAL SINUSITIS: Primary | ICD-10-CM

## 2022-10-17 DIAGNOSIS — J32.9 BACTERIAL SINUSITIS: Primary | ICD-10-CM

## 2022-10-17 PROCEDURE — 99214 OFFICE O/P EST MOD 30 MIN: CPT | Performed by: INTERNAL MEDICINE

## 2022-10-17 RX ORDER — AMOXICILLIN AND CLAVULANATE POTASSIUM 875; 125 MG/1; MG/1
1 TABLET, FILM COATED ORAL 2 TIMES DAILY
Qty: 10 TABLET | Refills: 0 | Status: SHIPPED | OUTPATIENT
Start: 2022-10-17 | End: 2022-10-22

## 2022-10-17 NOTE — PROGRESS NOTES
"Chief Complaint  Hoarse, Sore Throat, and FLUSH FACE     Subjective        Maryam Alvarez presents to [facility]  History of Present Illness  Here with 7 days of sore throat, congestion, facial pressure, mild cough; no fevers; covid negative at home    SUN, MDD, doing well without issues; no medication side effects; no thoughts of hurting self, others, suicide; mood is good        Objective   Vital Signs:  /70   Pulse 99   Temp 98.4 °F (36.9 °C)   Resp 18   Ht 167.6 cm (66\")   Wt 78.9 kg (174 lb)   SpO2 99%   BMI 28.08 kg/m²   Estimated body mass index is 28.08 kg/m² as calculated from the following:    Height as of this encounter: 167.6 cm (66\").    Weight as of this encounter: 78.9 kg (174 lb).          Physical Exam  Vitals and nursing note reviewed.   Constitutional:       Appearance: Normal appearance.   HENT:      Head: Normocephalic and atraumatic.      Right Ear: Tympanic membrane, ear canal and external ear normal.      Left Ear: Tympanic membrane, ear canal and external ear normal.      Nose: Nose normal.      Mouth/Throat:      Mouth: Mucous membranes are moist.      Pharynx: Oropharynx is clear.   Eyes:      Extraocular Movements: Extraocular movements intact.      Conjunctiva/sclera: Conjunctivae normal.      Pupils: Pupils are equal, round, and reactive to light.   Cardiovascular:      Rate and Rhythm: Normal rate and regular rhythm.      Pulses: Normal pulses.      Heart sounds: Normal heart sounds.   Pulmonary:      Effort: Pulmonary effort is normal.      Breath sounds: Normal breath sounds. No wheezing, rhonchi or rales.   Abdominal:      General: Abdomen is flat. There is no distension.      Palpations: Abdomen is soft.      Tenderness: There is no abdominal tenderness.   Musculoskeletal:         General: Normal range of motion.      Cervical back: Normal range of motion and neck supple.      Right lower leg: No edema.      Left " lower leg: No edema.   Skin:     General: Skin is warm and dry.      Capillary Refill: Capillary refill takes less than 2 seconds.      Findings: No rash.   Neurological:      General: No focal deficit present.      Mental Status: She is alert and oriented to person, place, and time. Mental status is at baseline.   Psychiatric:         Mood and Affect: Mood normal.         Behavior: Behavior normal.        Result Review :                Assessment and Plan   Diagnoses and all orders for this visit:    1. Bacterial sinusitis (Primary)    2. SUN (generalized anxiety disorder)    3. Recurrent major depressive disorder, remission status unspecified (HCC)    Other orders  -     amoxicillin-clavulanate (Augmentin) 875-125 MG per tablet; Take 1 tablet by mouth 2 (Two) Times a Day for 5 days.  Dispense: 10 tablet; Refill: 0    - start augmentin  - flonase, zyrtec  - rtc worsening, change in illness  - continue paxil, doing very well; continue outside time, exercise, therapy  - rtc routine follow up         Follow Up   No follow-ups on file.  Patient was given instructions and counseling regarding her condition or for health maintenance advice. Please see specific information pulled into the AVS if appropriate.

## 2022-11-04 ENCOUNTER — OFFICE VISIT (OUTPATIENT)
Dept: SURGERY | Facility: CLINIC | Age: 22
End: 2022-11-04

## 2022-11-04 VITALS
RESPIRATION RATE: 17 BRPM | HEART RATE: 100 BPM | BODY MASS INDEX: 27.97 KG/M2 | HEIGHT: 66 IN | SYSTOLIC BLOOD PRESSURE: 120 MMHG | WEIGHT: 174 LBS | DIASTOLIC BLOOD PRESSURE: 70 MMHG | OXYGEN SATURATION: 99 %

## 2022-11-04 DIAGNOSIS — N63.41 SUBAREOLAR MASS OF RIGHT BREAST: Primary | ICD-10-CM

## 2022-11-04 PROCEDURE — 88305 TISSUE EXAM BY PATHOLOGIST: CPT | Performed by: SURGERY

## 2022-11-04 PROCEDURE — 19083 BX BREAST 1ST LESION US IMAG: CPT | Performed by: SURGERY

## 2022-11-04 PROCEDURE — 99204 OFFICE O/P NEW MOD 45 MIN: CPT | Performed by: SURGERY

## 2022-11-04 NOTE — PROGRESS NOTES
[facility]     Referring Provider: Ibrahima Manning MD     Chief complaint: Right breast mass     HPI: Ms. Maryam Alvarez is a 21 yo woman, seen at the request of Dr. Ibrahima Manning, for a new diagnosis of a right breast mass. She first noticed a lump under her right nipple on self-exam in August. It has not changed in size since that time and she denies any associated pain. Ultrasound showed a 1.6 cm oval hypoechoic mass. She denies any family history of breast or ovarian cancer. She was joined today in clinic by her mother.     I personally reviewed her records and summarized her relevant breast history/imagin2022, Right Breast US ([facility]):  Ultrasound the palpable abnormality at 1:00 in the right breast shows a superficial nodule measuring 16 x 10 x 15 mm. It is wider than tall. The appearance suggests a fibroadenoma.     Review of Systems   Constitutional: Negative for appetite change, chills, diaphoresis, fatigue, fever and unexpected weight change.   HENT:   Negative for hearing loss, lump/mass, mouth sores, nosebleeds, sore throat, tinnitus, trouble swallowing and voice change.    Eyes: Negative for eye problems and icterus.   Respiratory: Negative for chest tightness, cough, hemoptysis, shortness of breath and wheezing.    Cardiovascular: Negative for chest pain, leg swelling and palpitations.   Gastrointestinal: Negative for abdominal distention, abdominal pain, blood in stool, constipation, diarrhea, nausea, rectal pain and vomiting.   Endocrine: Negative for hot flashes.   Genitourinary: Negative for bladder incontinence, difficulty urinating, dyspareunia, dysuria, frequency, hematuria, menstrual problem, nocturia, pelvic pain, vaginal bleeding and vaginal discharge.    Musculoskeletal: Negative for arthralgias, back pain, flank pain, gait problem, myalgias, neck pain and neck stiffness.   Skin: Negative for itching, rash and wound.   Neurological: Negative for dizziness,  extremity weakness, gait problem, headaches, light-headedness, numbness, seizures and speech difficulty.   Hematological: Positive for adenopathy. Does not bruise/bleed easily.   Psychiatric/Behavioral: Positive for sleep disturbance. Negative for confusion, decreased concentration, depression and suicidal ideas. The patient is not nervous/anxious.    I personally reviewed and updated the ROS.      Medications:    Current Outpatient Medications:   •  levonorgestrel-ethinyl estradiol (Larissia) 0.1-20 MG-MCG per tablet, Take 1 tablet by mouth Daily., Disp: 84 tablet, Rfl: 3  •  PARoxetine CR (Paxil CR) 25 MG 24 hr tablet, Take 1 tablet by mouth Every Morning., Disp: 30 tablet, Rfl: 3      Allergies:  No Known Allergies      Past Medical History:   Diagnosis Date   • Anxiety        Surgical History:  History reviewed. No pertinent surgical history.      Family History   Problem Relation Age of Onset   • No Known Problems Mother    • No Known Problems Father    • Multiple sclerosis Maternal Grandmother    • Meniere's disease Maternal Grandmother    • No Known Problems Maternal Grandfather    • No Known Problems Paternal Grandmother    • No Known Problems Paternal Grandfather        Social History     Socioeconomic History   • Marital status: Single   • Number of children: 0   Tobacco Use   • Smoking status: Never   • Smokeless tobacco: Never   Vaping Use   • Vaping Use: Never used   Substance and Sexual Activity   • Alcohol use: No   • Drug use: No   • Sexual activity: Yes     Partners: Male     Birth control/protection: OCP     Patient drinks 1-2 servings of caffeine per day.       GYNECOLOGIC HISTORY:   . P: 0. AB: 0.  Last menstrual period: 22  Age at menarche: 16  Age at first childbirth: n/a  Lactation/How long: n/a  Age at menopause: Premenopausal  Total years of oral contraceptive use: 4-5  Total years of hormone replacement therapy: 0      Physical Exam  Vitals:    22 0919   BP: 120/70   Pulse:  100   Resp: 17   SpO2: 99%     ECOG 0 - Asymptomatic  General: NAD, well appearing  Psych: a&o x 3, normal mood and affect  Eyes: EOMI, no scleral icterus  ENMT: neck supple without masses or thyromegaly, mucus membranes moist  Resp: normal effort, CTAB  CV: RRR, no murmurs, no edema  GI: soft, NT, ND  MSK: normal gait, normal ROM in bilateral shoulders  Lymph nodes: no cervical, supraclavicular or axillary lymphadenopathy  Breast: symmetric, moderate size, no ptosis, slight tubular shape  Right: No visible abnormalities on inspection while seated, with arms raised or hands on hips. At 12:00, 1 cm FN (under the areola), there is a mobile 1.5 cm mass. No skin changes, or nipple abnormalities.  Left: No visible abnormalities on inspection while seated, with arms raised or hands on hips. No masses, skin changes, or nipple abnormalities.      PROCEDURE: Percutaneous ultrasound-guided vacuum-assisted core breast biopsy  Indication: Ultrasound-visible breast mass  Location: Right breast, 12:00, 1 cm from the nipple  Consent: The risks, benefits, and alternatives to the procedure were discussed with the patient, who understood and wished to proceed. The risks described included, but were not limited to, bleeding, infection, pneumothorax, and inadequate sampling requiring either repeat percutaneous or open excisional biopsy.  Description of Right Breast Diagnostic Ultrasound: After the patient was positioned supine on the procedure table, I located the lesion using ultrasound. Using a 10MHz linear transducer, I scanned the breast at the area of interest. At 12:00, 1 cm FN, there is a an oval, hypoechoic, well-circumscribed mass. This correlates with clinical exam. This is amenable to ultrasound-guided core biopsy.   Description of Percutaneous Biopsy: The area was prepped with alcohol and draped in sterile fashion. I anesthetized the breast skin at the site of anticipated mammotomy with 1% lidocaine with epinephrine. I then  anesthetized the underlying subcutaneous tissue and breast parenchyma surrounding the lesion with 1% lidocaine with epinephrine under ultrasound guidance. In total, 25 mL of local anesthetic was used. I then made a nicking incision with an 15 blade and inserted the 7G encore biopsy device from lateral to medial through the lesion under ultrasound guidance. I then took 12 sequential core samples using the automated sampling of the encore device under direct visualization with ultrasound. I withdrew the biopsy device and placed a hydromark marker into the biopsy site. The marker was visualized within the lesion after placement. We held manual compression for 10 minutes, placed steri-strips at the mammotomy site, and wrapped the patient in a 6 inch ace wrap and a cold pack.  Marker placed: Hydromark  Tolerance: The patient tolerated the procedure well.  Disposition: See below.      Assessment:  22 y.o. F with a new diagnosis of a likely benign right breast mass.    Plan:  -Biopsy performed today. Follow-up in a few weeks to check the site and go over pathology.    Seema Callahan MD      CC:  MD Hermila Doss DO

## 2022-11-07 LAB
LAB AP CASE REPORT: NORMAL
LAB AP CLINICAL INFORMATION: NORMAL
LAB AP DIAGNOSIS COMMENT: NORMAL
PATH REPORT.FINAL DX SPEC: NORMAL
PATH REPORT.GROSS SPEC: NORMAL

## 2022-12-15 ENCOUNTER — OFFICE VISIT (OUTPATIENT)
Dept: SURGERY | Facility: CLINIC | Age: 22
End: 2022-12-15

## 2022-12-15 VITALS
WEIGHT: 174 LBS | OXYGEN SATURATION: 98 % | BODY MASS INDEX: 27.97 KG/M2 | HEART RATE: 100 BPM | SYSTOLIC BLOOD PRESSURE: 128 MMHG | RESPIRATION RATE: 18 BRPM | DIASTOLIC BLOOD PRESSURE: 80 MMHG | HEIGHT: 66 IN

## 2022-12-15 DIAGNOSIS — D24.1 BREAST FIBROADENOMA IN FEMALE, RIGHT: Primary | ICD-10-CM

## 2022-12-15 PROCEDURE — 99213 OFFICE O/P EST LOW 20 MIN: CPT | Performed by: SURGERY

## 2022-12-15 NOTE — PROGRESS NOTES
[facility]     Referring Provider: Ibrahima Manning MD     Chief complaint: Post-biopsy visit     HPI:   11/4/2022:  Ms. Maryam Alvarez is a 21 yo woman, seen at the request of Dr. Ibrahima Manning, for a new diagnosis of a right breast mass. She first noticed a lump under her right nipple on self-exam in August. It has not changed in size since that time and she denies any associated pain. Ultrasound showed a 1.6 cm oval hypoechoic mass. She denies any family history of breast or ovarian cancer.      12/15/2022, Interval History:  At her last visit, she underwent a core needle biopsy which showed a fibroadenoma. She denies any issues with the biopsy site.       Breast history/imaging (updated 12/15/2022):    9/19/2022, Right Breast US ([facility]):  Ultrasound the palpable abnormality at 1:00 in the right breast shows a superficial nodule measuring 16 x 10 x 15 mm. It is wider than tall. The appearance suggests a fibroadenoma.      11/7/22, Right breast ultrasound-guided core needle biopsy:  1. Right Breast, 12 O'clock, 1 cm from Nipple, Core Biopsies for a Mass:               A. Fibroadenoma with foci of usual ductal hyperplasia.               B. No atypical hyperplasia, in situ nor invasive carcinoma identified.       Review of Systems:  See interval history.       Medications:    Current Outpatient Medications:   •  levonorgestrel-ethinyl estradiol (Larissia) 0.1-20 MG-MCG per tablet, Take 1 tablet by mouth Daily., Disp: 84 tablet, Rfl: 3  •  PARoxetine CR (Paxil CR) 25 MG 24 hr tablet, Take 1 tablet by mouth Every Morning., Disp: 30 tablet, Rfl: 3      Allergies:  No Known Allergies      Family History   Problem Relation Age of Onset   • No Known Problems Mother    • No Known Problems Father    • Multiple sclerosis Maternal Grandmother    • Meniere's disease Maternal Grandmother    • No Known Problems Maternal Grandfather    • No Known Problems Paternal Grandmother    • No Known Problems Paternal  Grandfather        PHYSICAL EXAMINATION:   Vitals:    12/15/22 0800   BP: 128/80   Pulse: 100   Resp: 18   SpO2: 98%     ECOG 0 - Asymptomatic  General: NAD, well appearing  Psych: a&o x 3, normal mood and affect  Eyes: EOMI, no scleral icterus  ENMT: neck supple without masses or thyromegaly, mucus membranes moist  MSK: normal gait, normal ROM in bilateral shoulders  Lymph nodes: no cervical, supraclavicular or axillary lymphadenopathy  Breast: symmetric, moderate size, no ptosis, slight tubular shape  Right: No visible abnormalities on inspection while seated, with arms raised or hands on hips. No masses, skin changes, or nipple abnormalities.  Left: No visible abnormalities on inspection while seated, with arms raised or hands on hips. No masses, skin changes, or nipple abnormalities.    Right breast, in-office ultrasound: At 12:00, 1 cm FN, there is a small residual portion of the hypoechoic mass with biopsy clip visualized.       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    Discussion:  We reviewed the diagnosis.  I reassured her that no further intervention or surveillance is necessary.    Plan:  -F/u with me as necessary. She can start annual screening mammography at age 40.  -She was instructed to call with any questions, concerns or changes on BSE.    Seema Callahan MD      CC:  MD Hermila Doss DO

## 2023-02-06 RX ORDER — PAROXETINE HYDROCHLORIDE HEMIHYDRATE 25 MG/1
TABLET, FILM COATED, EXTENDED RELEASE ORAL
Qty: 30 TABLET | Refills: 3 | Status: SHIPPED | OUTPATIENT
Start: 2023-02-06

## 2023-02-06 NOTE — TELEPHONE ENCOUNTER
Rx Refill Note  Requested Prescriptions     Pending Prescriptions Disp Refills   • PARoxetine CR (PAXIL-CR) 25 MG 24 hr tablet [Pharmacy Med Name: PAROXETINE ER 25 MG TABLET] 30 tablet 3     Sig: TAKE 1 TABLET BY MOUTH EVERY DAY IN THE MORNING      Last office visit with prescribing clinician: 10/17/2022   Last telemedicine visit with prescribing clinician: Visit date not found   Next office visit with prescribing clinician: Visit date not found                         Would you like a call back once the refill request has been completed: [] Yes [] No    If the office needs to give you a call back, can they leave a voicemail: [] Yes [] No    Jerry De La Rosa MA  02/06/23, 08:24 EST

## 2023-03-15 ENCOUNTER — OFFICE VISIT (OUTPATIENT)
Dept: INTERNAL MEDICINE | Facility: CLINIC | Age: 23
End: 2023-03-15
Payer: COMMERCIAL

## 2023-03-15 VITALS
RESPIRATION RATE: 16 BRPM | SYSTOLIC BLOOD PRESSURE: 124 MMHG | WEIGHT: 196 LBS | DIASTOLIC BLOOD PRESSURE: 80 MMHG | BODY MASS INDEX: 31.5 KG/M2 | OXYGEN SATURATION: 99 % | HEIGHT: 66 IN | HEART RATE: 80 BPM | TEMPERATURE: 98.4 F

## 2023-03-15 DIAGNOSIS — R63.5 WEIGHT GAIN: Primary | ICD-10-CM

## 2023-03-15 PROCEDURE — 99214 OFFICE O/P EST MOD 30 MIN: CPT | Performed by: INTERNAL MEDICINE

## 2023-03-15 NOTE — PROGRESS NOTES
"Chief Complaint  Weight Gain    Subjective        Maryam Lilia Alvarez presents to [facility]  History of Present Illness  SUN, MDD, doing well without issues; no medication side effects; no thoughts of hurting self, others, suicide; mood is good    Has had 20lb weight gain over the last few months, does not feel she has had a change in her diet or exercise plan      Objective   Vital Signs:  /80   Pulse 80   Temp 98.4 °F (36.9 °C)   Resp 16   Ht 167.6 cm (66\")   Wt 88.9 kg (196 lb)   SpO2 99%   BMI 31.64 kg/m²   Estimated body mass index is 31.64 kg/m² as calculated from the following:    Height as of this encounter: 167.6 cm (66\").    Weight as of this encounter: 88.9 kg (196 lb).             Physical Exam  Vitals and nursing note reviewed.   Constitutional:       Appearance: Normal appearance.   HENT:      Head: Normocephalic and atraumatic.      Right Ear: Tympanic membrane, ear canal and external ear normal.      Left Ear: Tympanic membrane, ear canal and external ear normal.      Nose: Nose normal.      Mouth/Throat:      Mouth: Mucous membranes are moist.      Pharynx: Oropharynx is clear.   Eyes:      Extraocular Movements: Extraocular movements intact.      Conjunctiva/sclera: Conjunctivae normal.      Pupils: Pupils are equal, round, and reactive to light.   Cardiovascular:      Rate and Rhythm: Normal rate and regular rhythm.      Pulses: Normal pulses.      Heart sounds: Normal heart sounds.   Pulmonary:      Effort: Pulmonary effort is normal.      Breath sounds: Normal breath sounds. No wheezing, rhonchi or rales.   Abdominal:      General: Abdomen is flat. There is no distension.      Palpations: Abdomen is soft.      Tenderness: There is no abdominal tenderness.   Musculoskeletal:         General: Normal range of motion.      Cervical back: Normal range of motion and neck supple.      Right lower leg: No edema.      Left lower leg: No edema. "   Skin:     General: Skin is warm and dry.      Capillary Refill: Capillary refill takes less than 2 seconds.      Findings: No rash.   Neurological:      General: No focal deficit present.      Mental Status: She is alert and oriented to person, place, and time. Mental status is at baseline.   Psychiatric:         Mood and Affect: Mood normal.         Behavior: Behavior normal.        Result Review :                   Assessment and Plan   Diagnoses and all orders for this visit:    1. Weight gain (Primary)  -     Comprehensive Metabolic Panel  -     Lipid Panel With / Chol / HDL Ratio  -     Hemoglobin A1c  -     TSH    - check labs as above  - continue paroxetine, doing well, she is hesitant to change meds at this time and I agree; will keep calorie count, monitor diet  - rtc 1 month or sooner pending above         Follow Up   No follow-ups on file.  Patient was given instructions and counseling regarding her condition or for health maintenance advice. Please see specific information pulled into the AVS if appropriate.

## 2023-03-16 LAB
ALBUMIN SERPL-MCNC: 4.6 G/DL (ref 3.5–5.2)
ALBUMIN/GLOB SERPL: 1.8 G/DL
ALP SERPL-CCNC: 63 U/L (ref 39–117)
ALT SERPL-CCNC: 17 U/L (ref 1–33)
AST SERPL-CCNC: 20 U/L (ref 1–32)
BILIRUB SERPL-MCNC: 0.2 MG/DL (ref 0–1.2)
BUN SERPL-MCNC: 12 MG/DL (ref 6–20)
BUN/CREAT SERPL: 15.8 (ref 7–25)
CALCIUM SERPL-MCNC: 10 MG/DL (ref 8.6–10.5)
CHLORIDE SERPL-SCNC: 104 MMOL/L (ref 98–107)
CHOLEST SERPL-MCNC: 242 MG/DL (ref 0–200)
CHOLEST/HDLC SERPL: 3.18 {RATIO}
CO2 SERPL-SCNC: 26.8 MMOL/L (ref 22–29)
CREAT SERPL-MCNC: 0.76 MG/DL (ref 0.57–1)
EGFRCR SERPLBLD CKD-EPI 2021: 113.8 ML/MIN/1.73
GLOBULIN SER CALC-MCNC: 2.6 GM/DL
GLUCOSE SERPL-MCNC: 86 MG/DL (ref 65–99)
HBA1C MFR BLD: 4.8 % (ref 4.8–5.6)
HDLC SERPL-MCNC: 76 MG/DL (ref 40–60)
LDLC SERPL CALC-MCNC: 155 MG/DL (ref 0–100)
POTASSIUM SERPL-SCNC: 4.3 MMOL/L (ref 3.5–5.2)
PROT SERPL-MCNC: 7.2 G/DL (ref 6–8.5)
SODIUM SERPL-SCNC: 140 MMOL/L (ref 136–145)
TRIGL SERPL-MCNC: 68 MG/DL (ref 0–150)
TSH SERPL DL<=0.005 MIU/L-ACNC: 3.39 UIU/ML (ref 0.27–4.2)
VLDLC SERPL CALC-MCNC: 11 MG/DL (ref 5–40)

## 2023-05-04 ENCOUNTER — OFFICE VISIT (OUTPATIENT)
Dept: INTERNAL MEDICINE | Facility: CLINIC | Age: 23
End: 2023-05-04
Payer: COMMERCIAL

## 2023-05-04 VITALS
OXYGEN SATURATION: 98 % | HEART RATE: 78 BPM | WEIGHT: 186 LBS | BODY MASS INDEX: 29.89 KG/M2 | RESPIRATION RATE: 18 BRPM | HEIGHT: 66 IN | TEMPERATURE: 98.3 F | DIASTOLIC BLOOD PRESSURE: 76 MMHG | SYSTOLIC BLOOD PRESSURE: 122 MMHG

## 2023-05-04 DIAGNOSIS — N63.13 MASS OF LOWER OUTER QUADRANT OF RIGHT BREAST: Primary | ICD-10-CM

## 2023-05-04 NOTE — PROGRESS NOTES
"Chief Complaint  Breast Mass (Right breast )    Subjective        Maryam Alvarez presents to [facility]  History of Present Illness  Right breast lump noticed yesterday. Similar to what happened in Sep 2022 but in different location. Had US at that time and biopsy confirming fibrocystic changes. Previous breast lump was just medial to the right breast nipple. This current mass is noted to be at the 8 o clock position in the RLQ of the right breast. Denies any weight loss, change in breast architecture, or nipple discharge. No family history of breast cancer in men or women.       Objective   Vital Signs:  /76   Pulse 78   Temp 98.3 °F (36.8 °C)   Resp 18   Ht 167.6 cm (66\")   Wt 84.4 kg (186 lb)   SpO2 98%   BMI 30.02 kg/m²   Estimated body mass index is 30.02 kg/m² as calculated from the following:    Height as of this encounter: 167.6 cm (66\").    Weight as of this encounter: 84.4 kg (186 lb).             Physical Exam  Vitals and nursing note reviewed.   Constitutional:       Appearance: Normal appearance.   HENT:      Head: Normocephalic and atraumatic.      Right Ear: External ear normal.      Left Ear: External ear normal.      Nose: Nose normal.      Mouth/Throat:      Mouth: Mucous membranes are moist.      Pharynx: Oropharynx is clear.   Eyes:      Extraocular Movements: Extraocular movements intact.      Conjunctiva/sclera: Conjunctivae normal.      Pupils: Pupils are equal, round, and reactive to light.   Cardiovascular:      Rate and Rhythm: Normal rate and regular rhythm.      Pulses: Normal pulses.      Heart sounds: Normal heart sounds.   Pulmonary:      Effort: Pulmonary effort is normal.      Breath sounds: Normal breath sounds.   Abdominal:      General: Abdomen is flat.      Palpations: Abdomen is soft.   Musculoskeletal:         General: No tenderness. Normal range of motion.      Cervical back: Normal range of motion and neck " supple.   Skin:     General: Skin is warm and dry.      Capillary Refill: Capillary refill takes less than 2 seconds.   Neurological:      General: No focal deficit present.      Mental Status: She is alert and oriented to person, place, and time. Mental status is at baseline.   Psychiatric:         Mood and Affect: Mood normal.         Behavior: Behavior normal.        Result Review :  The following data was reviewed by: Usama White, Medical Student on 05/04/2023:  Common labs        3/15/2023    08:55   Common Labs   Glucose 86     BUN 12     Creatinine 0.76     Sodium 140     Potassium 4.3     Chloride 104     Calcium 10.0     Total Protein 7.2     Albumin 4.6     Total Bilirubin 0.2     Alkaline Phosphatase 63     AST (SGOT) 20     ALT (SGPT) 17     Total Cholesterol 242     Triglycerides 68     HDL Cholesterol 76     LDL Cholesterol  155     Hemoglobin A1C 4.80     Reviewed previous pathology report and breast US from 09/2022             Assessment and Plan   Diagnoses and all orders for this visit:    1. Mass of lower outer quadrant of right breast (Primary)    - Given prior history is most likely benign cyst or fibroadenoma  - Discussed with patient options for evaluation. From shared decision making, elected to obtain right breast US and mammogram  - Instructed to call if breast mass worsens, develops nipple discharge, or change in external appearance of breast or nipple         Follow Up   No follow-ups on file.  Patient was given instructions and counseling regarding her condition or for health maintenance advice. Please see specific information pulled into the AVS if appropriate.

## 2023-05-12 RX ORDER — PAROXETINE HYDROCHLORIDE HEMIHYDRATE 25 MG/1
25 TABLET, FILM COATED, EXTENDED RELEASE ORAL EVERY MORNING
Qty: 30 TABLET | Refills: 3 | Status: SHIPPED | OUTPATIENT
Start: 2023-05-12

## 2023-05-12 RX ORDER — LEVONORGESTREL AND ETHINYL ESTRADIOL 0.1-0.02MG
1 KIT ORAL DAILY
Qty: 84 TABLET | Refills: 3 | Status: SHIPPED | OUTPATIENT
Start: 2023-05-12

## 2023-05-12 NOTE — TELEPHONE ENCOUNTER
Rx Refill Note  Requested Prescriptions     Pending Prescriptions Disp Refills   • PARoxetine CR (PAXIL-CR) 25 MG 24 hr tablet 30 tablet 3     Sig: Take 1 tablet by mouth Every Morning.      Last office visit with prescribing clinician: 5/4/2023   Last telemedicine visit with prescribing clinician: 5/4/2023   Next office visit with prescribing clinician: Visit date not found                         Would you like a call back once the refill request has been completed: [] Yes [] No    If the office needs to give you a call back, can they leave a voicemail: [] Yes [] No    Jerry De La Rosa MA  05/12/23, 10:18 EDT

## 2023-05-15 ENCOUNTER — TELEPHONE (OUTPATIENT)
Dept: INTERNAL MEDICINE | Facility: CLINIC | Age: 23
End: 2023-05-15

## 2023-05-15 NOTE — TELEPHONE ENCOUNTER
Caller: Maryam Alvarez    Relationship: Self    Best call back number: [phone]    What was the call regarding:REFERRAL FOR AN ULTRASOUND    Do you require a callback: YES        DELETE AFTER READING TO PATIENT: “ Thank you for sharing this information with me. I will send a message to the . Please allow up to 48 hours for the  to follow up on this request.”

## 2023-06-06 ENCOUNTER — TELEPHONE (OUTPATIENT)
Dept: INTERNAL MEDICINE | Facility: CLINIC | Age: 23
End: 2023-06-06
Payer: COMMERCIAL

## 2023-06-06 RX ORDER — SERTRALINE HYDROCHLORIDE 25 MG/1
25 TABLET, FILM COATED ORAL DAILY
Qty: 90 TABLET | Refills: 2 | Status: SHIPPED | OUTPATIENT
Start: 2023-06-06

## 2023-06-06 NOTE — ADDENDUM NOTE
Addended by: INEZ HOPE on: 6/6/2023 04:06 PM     Modules accepted: Orders     Dupixent Pregnancy And Lactation Text: This medication likely crosses the placenta but the risk for the fetus is uncertain. This medication is excreted in breast milk.

## 2023-07-31 ENCOUNTER — TELEPHONE (OUTPATIENT)
Dept: INTERNAL MEDICINE | Facility: CLINIC | Age: 23
End: 2023-07-31
Payer: COMMERCIAL

## 2023-07-31 RX ORDER — LEVONORGESTREL AND ETHINYL ESTRADIOL 0.1-0.02MG
1 KIT ORAL DAILY
Qty: 84 TABLET | Refills: 3 | OUTPATIENT
Start: 2023-07-31

## 2023-07-31 RX ORDER — LEVONORGESTREL AND ETHINYL ESTRADIOL 0.1-0.02MG
1 KIT ORAL DAILY
Qty: 84 TABLET | Refills: 6 | Status: SHIPPED | OUTPATIENT
Start: 2023-07-31

## 2023-10-06 DIAGNOSIS — F33.9 RECURRENT MAJOR DEPRESSIVE DISORDER, REMISSION STATUS UNSPECIFIED: ICD-10-CM

## 2023-10-06 DIAGNOSIS — F41.1 GAD (GENERALIZED ANXIETY DISORDER): ICD-10-CM

## 2023-10-06 NOTE — TELEPHONE ENCOUNTER
Rx Refill Note  Requested Prescriptions     Pending Prescriptions Disp Refills    escitalopram (LEXAPRO) 10 MG tablet [Pharmacy Med Name: ESCITALOPRAM 10 MG TABLET] 30 tablet 2     Sig: TAKE 1 TABLET BY MOUTH EVERY DAY      Last office visit with prescribing clinician: 6/20/2023   Last telemedicine visit with prescribing clinician: Visit date not found   Next office visit with prescribing clinician: Visit date not found                         Would you like a call back once the refill request has been completed: [] Yes [] No    If the office needs to give you a call back, can they leave a voicemail: [] Yes [] No    Jerry De La Rosa MA  10/06/23, 07:48 EDT

## 2023-10-08 RX ORDER — ESCITALOPRAM OXALATE 10 MG/1
TABLET ORAL
Qty: 30 TABLET | Refills: 2 | Status: SHIPPED | OUTPATIENT
Start: 2023-10-08

## 2023-11-14 DIAGNOSIS — F41.1 GAD (GENERALIZED ANXIETY DISORDER): ICD-10-CM

## 2023-11-14 DIAGNOSIS — F33.9 RECURRENT MAJOR DEPRESSIVE DISORDER, REMISSION STATUS UNSPECIFIED: ICD-10-CM

## 2023-11-14 RX ORDER — ESCITALOPRAM OXALATE 10 MG/1
10 TABLET ORAL DAILY
Qty: 90 TABLET | Refills: 0 | Status: SHIPPED | OUTPATIENT
Start: 2023-11-14

## 2023-12-12 ENCOUNTER — OFFICE VISIT (OUTPATIENT)
Dept: FAMILY MEDICINE CLINIC | Facility: CLINIC | Age: 23
End: 2023-12-12
Payer: COMMERCIAL

## 2023-12-12 VITALS
HEART RATE: 109 BPM | TEMPERATURE: 98.7 F | DIASTOLIC BLOOD PRESSURE: 76 MMHG | BODY MASS INDEX: 33.48 KG/M2 | OXYGEN SATURATION: 99 % | SYSTOLIC BLOOD PRESSURE: 131 MMHG | WEIGHT: 208.3 LBS | HEIGHT: 66 IN

## 2023-12-12 DIAGNOSIS — K64.4 EXTERNAL HEMORRHOID, BLEEDING: Primary | ICD-10-CM

## 2023-12-12 DIAGNOSIS — F41.1 GAD (GENERALIZED ANXIETY DISORDER): ICD-10-CM

## 2023-12-12 DIAGNOSIS — Z30.011 OCP (ORAL CONTRACEPTIVE PILLS) INITIATION: ICD-10-CM

## 2023-12-12 DIAGNOSIS — F33.9 RECURRENT MAJOR DEPRESSIVE DISORDER, REMISSION STATUS UNSPECIFIED: ICD-10-CM

## 2023-12-12 PROCEDURE — 99203 OFFICE O/P NEW LOW 30 MIN: CPT | Performed by: NURSE PRACTITIONER

## 2023-12-12 RX ORDER — HYDROCORTISONE ACETATE PRAMOXINE HCL 2.5; 1 G/100G; G/100G
CREAM TOPICAL 3 TIMES DAILY
Qty: 30 G | Refills: 1 | Status: SHIPPED | OUTPATIENT
Start: 2023-12-12 | End: 2023-12-15 | Stop reason: CLARIF

## 2023-12-12 RX ORDER — ESCITALOPRAM OXALATE 10 MG/1
10 TABLET ORAL DAILY
Qty: 90 TABLET | Refills: 3 | Status: SHIPPED | OUTPATIENT
Start: 2023-12-12

## 2023-12-12 RX ORDER — LEVONORGESTREL AND ETHINYL ESTRADIOL 0.1-0.02MG
1 KIT ORAL DAILY
Qty: 84 TABLET | Refills: 3 | Status: SHIPPED | OUTPATIENT
Start: 2023-12-12

## 2023-12-12 NOTE — LETTER
December 12, 2023     Patient: Maryam Alvarez   YOB: 2000   Date of Visit: 12/12/2023       To Whom It May Concern:    It is my medical opinion that Maryam Alvarez may return to full duty immediately with no restrictions.           Sincerely,        DEEPTI Francis    CC: No Recipients

## 2023-12-12 NOTE — PROGRESS NOTES
Chief Complaint  Establish Care (Establish Care with Physical Exam ) and Rectal Bleeding    Subjective          Maryam Alvarez is a 23 y.o. female who presents to [facility]    Rectal Bleeding  Pertinent negatives include no chest pain, chills, coughing, fatigue, fever, headaches, nausea, neck pain, rash or vomiting.     A couple of years ago started noticing bleeding after bowel movement,(started taking Miralax) only a small amount of blood, then noticed mucousy brown discharged.  Stopped taking Miralax recently (a couple of months ago)  noticed harder to have BM at first but now having soft well formed stools this last month.  Using a natural oil on an area that feels like torn , just feels different.  No bleeding for the past month.    PHQ-2 Total Score: 0   PHQ-9 Total Score: 0        Review of Systems   Constitutional:  Negative for chills, fatigue and fever.   Respiratory:  Negative for cough and shortness of breath.    Cardiovascular:  Negative for chest pain and palpitations.   Gastrointestinal:  Positive for anal bleeding, constipation and hematochezia. Negative for diarrhea, nausea and vomiting.   Musculoskeletal:  Negative for back pain and neck pain.   Skin:  Negative for rash.   Neurological:  Negative for dizziness and headaches.          Medical History: has a past medical history of Anxiety and Breast mass, right.     Surgical History: has no past surgical history on file.     Family History: family history includes Meniere's disease in her maternal grandmother; Multiple sclerosis in her maternal grandmother; No Known Problems in her father, maternal grandfather, mother, paternal grandfather, and paternal grandmother.     Social History: reports that she has never smoked. She has never used smokeless tobacco. She reports that she does not drink alcohol and does not use drugs.    Allergies: Zoloft [sertraline hcl]      Health Maintenance Due   Topic Date Due     "CHLAMYDIA SCREENING  04/11/2023    ANNUAL PHYSICAL  05/02/2023            Current Outpatient Medications:     escitalopram (LEXAPRO) 10 MG tablet, Take 1 tablet by mouth Daily., Disp: 90 tablet, Rfl: 3    hydrOXYzine (ATARAX) 25 MG tablet, TAKE 1 TABLET BY MOUTH THREE TIMES A DAY AS NEEDED FOR ITCHING, Disp: 90 tablet, Rfl: 0    levonorgestrel-ethinyl estradiol (Larissia) 0.1-20 MG-MCG per tablet, Take 1 tablet by mouth Daily., Disp: 84 tablet, Rfl: 3    Hydrocort-Pramoxine, Perianal, (ANALPRAM-HC) 2.5-1 % rectal cream, Insert  into the rectum 3 (Three) Times a Day., Disp: 30 g, Rfl: 1      Immunization History   Administered Date(s) Administered    31-influenza Vac Quardvalent Preservativ 10/05/2020    COVID-19 (PFIZER) Purple Cap Monovalent 04/12/2021, 05/03/2021    COVID-19 F23 (PFIZER) 12YRS+ (COMIRNATY) 11/14/2023    Covid-19 (Pfizer) Gray Cap Monovalent 02/14/2022    Fluzone (or Fluarix & Flulaval for VFC) >6mos 11/14/2023    Hep A, 2 Dose 03/14/2018, 09/20/2018    Hpv9 01/06/2020, 01/28/2021, 05/24/2021    Meningococcal MCV4P (Menactra) 08/02/2018    Tdap 05/02/2022         Objective       Vitals:    12/12/23 1026   BP: 131/76   BP Location: Right arm   Patient Position: Sitting   Cuff Size: Large Adult   Pulse: 109   Temp: 98.7 °F (37.1 °C)   TempSrc: Temporal   SpO2: 99%   Weight: 94.5 kg (208 lb 4.8 oz)   Height: 167.6 cm (66\")   PainSc: 0-No pain      Body mass index is 33.62 kg/m².   Wt Readings from Last 3 Encounters:   12/12/23 94.5 kg (208 lb 4.8 oz)   11/28/23 95.5 kg (210 lb 9.6 oz)   06/20/23 88.9 kg (196 lb)      BP Readings from Last 3 Encounters:   12/12/23 131/76   11/28/23 131/86   06/20/23 118/72        BMI is >= 30 and <35. (Class 1 Obesity). The following options were offered after discussion;: exercise counseling/recommendations and nutrition counseling/recommendations       Physical Exam  Vitals reviewed.   Constitutional:       Appearance: Normal appearance.   HENT:      Head: " Normocephalic and atraumatic.   Cardiovascular:      Rate and Rhythm: Normal rate and regular rhythm.      Pulses: Normal pulses.      Heart sounds: Normal heart sounds.   Pulmonary:      Effort: Pulmonary effort is normal.      Breath sounds: Normal breath sounds.   Genitourinary:         Comments: Small soft external hemorrhoid at noon  Skin:     General: Skin is warm and dry.   Neurological:      Mental Status: She is alert and oriented to person, place, and time.   Psychiatric:         Mood and Affect: Mood normal.         Behavior: Behavior normal.         Thought Content: Thought content normal.         Judgment: Judgment normal.             Result Review :       Common labs          3/15/2023    08:55   Common Labs   Glucose 86    BUN 12    Creatinine 0.76    Sodium 140    Potassium 4.3    Chloride 104    Calcium 10.0    Total Protein 7.2    Albumin 4.6    Total Bilirubin 0.2    Alkaline Phosphatase 63    AST (SGOT) 20    ALT (SGPT) 17    Total Cholesterol 242    Triglycerides 68    HDL Cholesterol 76    LDL Cholesterol  155    Hemoglobin A1C 4.80                       Assessment and Plan        Diagnoses and all orders for this visit:    1. External hemorrhoid, bleeding (Primary)  -     Hydrocort-Pramoxine, Perianal, (ANALPRAM-HC) 2.5-1 % rectal cream; Insert  into the rectum 3 (Three) Times a Day.  Dispense: 30 g; Refill: 1    2. SUN (generalized anxiety disorder)  Comments:  Managed on Lexapro  Orders:  -     escitalopram (LEXAPRO) 10 MG tablet; Take 1 tablet by mouth Daily.  Dispense: 90 tablet; Refill: 3    3. Recurrent major depressive disorder, remission status unspecified  -     escitalopram (LEXAPRO) 10 MG tablet; Take 1 tablet by mouth Daily.  Dispense: 90 tablet; Refill: 3    4. OCP (oral contraceptive pills) initiation  -     levonorgestrel-ethinyl estradiol (Larissia) 0.1-20 MG-MCG per tablet; Take 1 tablet by mouth Daily.  Dispense: 84 tablet; Refill: 3          Follow Up     Return if symptoms  worsen or fail to improve.    Patient was given instructions and counseling regarding her condition or for health maintenance advice. Please see specific information pulled into the AVS if appropriate.     Oksana Magana, APRN

## 2023-12-13 ENCOUNTER — TELEPHONE (OUTPATIENT)
Dept: FAMILY MEDICINE CLINIC | Facility: CLINIC | Age: 23
End: 2023-12-13
Payer: COMMERCIAL

## 2023-12-13 NOTE — TELEPHONE ENCOUNTER
Caller: Maryam Alvarez    Relationship: Self    Best call back number: [phone]       What was the call regarding:      THE PATIENT SAID THE HEMORRHOID CREAM IS NOT COVERED BY THE INSURANCE AND IS WANTING PCP TO PRESCRIBE SOMETHING ELSE        PLEASE ADVISE PATIENT       [id number] - [place], [place] - [address] - [phone]  - [phone]

## 2023-12-14 NOTE — TELEPHONE ENCOUNTER
Caller: Maryam Alvarez    Relationship to patient: Self    Best call back number: [phone]    Patient is needing: PATIENT CALLING IN FOR AN UPDATE ON THIS

## 2023-12-15 RX ORDER — HYDROCORTISONE 25 MG/G
CREAM TOPICAL 2 TIMES DAILY
Qty: 28 G | Refills: 1 | Status: SHIPPED | OUTPATIENT
Start: 2023-12-15

## 2024-02-12 ENCOUNTER — TELEPHONE (OUTPATIENT)
Dept: FAMILY MEDICINE CLINIC | Facility: CLINIC | Age: 24
End: 2024-02-12
Payer: COMMERCIAL

## 2024-02-12 ENCOUNTER — OFFICE VISIT (OUTPATIENT)
Dept: OBSTETRICS AND GYNECOLOGY | Facility: CLINIC | Age: 24
End: 2024-02-12
Payer: COMMERCIAL

## 2024-02-12 VITALS
HEIGHT: 66 IN | WEIGHT: 217 LBS | SYSTOLIC BLOOD PRESSURE: 128 MMHG | HEART RATE: 97 BPM | BODY MASS INDEX: 34.87 KG/M2 | DIASTOLIC BLOOD PRESSURE: 83 MMHG

## 2024-02-12 DIAGNOSIS — Z01.419 ENCOUNTER FOR GYNECOLOGICAL EXAMINATION WITHOUT ABNORMAL FINDING: Primary | ICD-10-CM

## 2024-02-12 PROCEDURE — 99385 PREV VISIT NEW AGE 18-39: CPT | Performed by: NURSE PRACTITIONER

## 2024-02-12 PROCEDURE — G0123 SCREEN CERV/VAG THIN LAYER: HCPCS

## 2024-02-12 PROCEDURE — 99459 PELVIC EXAMINATION: CPT | Performed by: NURSE PRACTITIONER

## 2024-02-13 ENCOUNTER — PATIENT ROUNDING (BHMG ONLY) (OUTPATIENT)
Dept: OBSTETRICS AND GYNECOLOGY | Facility: CLINIC | Age: 24
End: 2024-02-13
Payer: COMMERCIAL

## 2024-02-16 DIAGNOSIS — K62.5 BRIGHT RED RECTAL BLEEDING: Primary | ICD-10-CM

## 2024-02-16 LAB
CONV .: NORMAL
CYTOLOGIST CVX/VAG CYTO: NORMAL
CYTOLOGY CVX/VAG DOC CYTO: NORMAL
CYTOLOGY CVX/VAG DOC THIN PREP: NORMAL
DX ICD CODE: NORMAL
HIV 1 & 2 AB SER-IMP: NORMAL
Lab: NORMAL
OTHER STN SPEC: NORMAL
STAT OF ADQ CVX/VAG CYTO-IMP: NORMAL

## 2024-02-19 ENCOUNTER — OFFICE VISIT (OUTPATIENT)
Dept: FAMILY MEDICINE CLINIC | Facility: CLINIC | Age: 24
End: 2024-02-19
Payer: COMMERCIAL

## 2024-02-19 ENCOUNTER — LAB (OUTPATIENT)
Dept: LAB | Facility: HOSPITAL | Age: 24
End: 2024-02-19
Payer: COMMERCIAL

## 2024-02-19 VITALS
WEIGHT: 215.2 LBS | BODY MASS INDEX: 34.58 KG/M2 | DIASTOLIC BLOOD PRESSURE: 78 MMHG | TEMPERATURE: 98.4 F | HEART RATE: 96 BPM | OXYGEN SATURATION: 99 % | HEIGHT: 66 IN | SYSTOLIC BLOOD PRESSURE: 127 MMHG

## 2024-02-19 DIAGNOSIS — F41.1 GAD (GENERALIZED ANXIETY DISORDER): ICD-10-CM

## 2024-02-19 DIAGNOSIS — R19.7 DIARRHEA, UNSPECIFIED TYPE: ICD-10-CM

## 2024-02-19 DIAGNOSIS — F33.9 RECURRENT MAJOR DEPRESSIVE DISORDER, REMISSION STATUS UNSPECIFIED: ICD-10-CM

## 2024-02-19 DIAGNOSIS — K62.5 BRIGHT RED RECTAL BLEEDING: Primary | ICD-10-CM

## 2024-02-19 LAB
BASOPHILS # BLD AUTO: 0.05 10*3/MM3 (ref 0–0.2)
BASOPHILS NFR BLD AUTO: 0.7 % (ref 0–1.5)
DEPRECATED RDW RBC AUTO: 38.2 FL (ref 37–54)
DEVELOPER EXPIRATION DATE: NORMAL
DEVELOPER LOT NUMBER: NORMAL
EOSINOPHIL # BLD AUTO: 0.13 10*3/MM3 (ref 0–0.4)
EOSINOPHIL NFR BLD AUTO: 1.8 % (ref 0.3–6.2)
ERYTHROCYTE [DISTWIDTH] IN BLOOD BY AUTOMATED COUNT: 11.9 % (ref 12.3–15.4)
EXPIRATION DATE: NORMAL
FECAL OCCULT BLOOD SCREEN, POC: NEGATIVE
HCT VFR BLD AUTO: 39.3 % (ref 34–46.6)
HGB BLD-MCNC: 13.4 G/DL (ref 12–15.9)
IMM GRANULOCYTES # BLD AUTO: 0.02 10*3/MM3 (ref 0–0.05)
IMM GRANULOCYTES NFR BLD AUTO: 0.3 % (ref 0–0.5)
LYMPHOCYTES # BLD AUTO: 1.5 10*3/MM3 (ref 0.7–3.1)
LYMPHOCYTES NFR BLD AUTO: 20.2 % (ref 19.6–45.3)
Lab: 322
MCH RBC QN AUTO: 30 PG (ref 26.6–33)
MCHC RBC AUTO-ENTMCNC: 34.1 G/DL (ref 31.5–35.7)
MCV RBC AUTO: 87.9 FL (ref 79–97)
MONOCYTES # BLD AUTO: 0.37 10*3/MM3 (ref 0.1–0.9)
MONOCYTES NFR BLD AUTO: 5 % (ref 5–12)
NEGATIVE CONTROL: NEGATIVE
NEUTROPHILS NFR BLD AUTO: 5.35 10*3/MM3 (ref 1.7–7)
NEUTROPHILS NFR BLD AUTO: 72 % (ref 42.7–76)
NRBC BLD AUTO-RTO: 0 /100 WBC (ref 0–0.2)
PLATELET # BLD AUTO: 223 10*3/MM3 (ref 140–450)
PMV BLD AUTO: 11.6 FL (ref 6–12)
POSITIVE CONTROL: POSITIVE
RBC # BLD AUTO: 4.47 10*6/MM3 (ref 3.77–5.28)
WBC NRBC COR # BLD AUTO: 7.42 10*3/MM3 (ref 3.4–10.8)

## 2024-02-19 PROCEDURE — 85025 COMPLETE CBC W/AUTO DIFF WBC: CPT | Performed by: NURSE PRACTITIONER

## 2024-02-19 PROCEDURE — 99214 OFFICE O/P EST MOD 30 MIN: CPT | Performed by: NURSE PRACTITIONER

## 2024-02-19 PROCEDURE — 82270 OCCULT BLOOD FECES: CPT | Performed by: NURSE PRACTITIONER

## 2024-02-19 RX ORDER — DICYCLOMINE HYDROCHLORIDE 10 MG/1
10 CAPSULE ORAL
Qty: 120 CAPSULE | Refills: 1 | Status: SHIPPED | OUTPATIENT
Start: 2024-02-19

## 2024-02-19 RX ORDER — ESCITALOPRAM OXALATE 20 MG/1
20 TABLET ORAL DAILY
Qty: 90 TABLET | Refills: 3 | Status: SHIPPED | OUTPATIENT
Start: 2024-02-19

## 2024-02-19 NOTE — PROGRESS NOTES
Chief Complaint  External Hemorrhoid (2mo f/u)    Subjective          Maryam Alvarez is a 23 y.o. female who presents to [facility]    History of Present Illness    Complains of bleeding after a bowel movement when she wipes a couple of times, one time it happened first thing in the morning before she had a BM.  Feels like increasing Lexapro to 20 mg has helped some but has only been on it for 3 days.    PHQ-2 Total Score:     PHQ-9 Total Score:        Review of Systems     Medical History: has a past medical history of Anxiety and Breast mass, right.     Surgical History: has no past surgical history on file.     Family History: family history includes Meniere's disease in her maternal grandmother; Multiple sclerosis in her maternal grandmother; No Known Problems in her father, maternal grandfather, mother, paternal grandfather, and paternal grandmother.     Social History: reports that she has never smoked. She has never used smokeless tobacco. She reports that she does not drink alcohol and does not use drugs.    Allergies: Zoloft [sertraline hcl]      Health Maintenance Due   Topic Date Due    CHLAMYDIA SCREENING  04/11/2023    ANNUAL PHYSICAL  05/02/2023            Current Outpatient Medications:     escitalopram (LEXAPRO) 20 MG tablet, Take 1 tablet by mouth Daily., Disp: 90 tablet, Rfl: 3    Hydrocortisone, Perianal, (ANUSOL-HC) 2.5 % rectal cream, Insert  into the rectum 2 (Two) Times a Day., Disp: 28 g, Rfl: 1    hydrOXYzine (ATARAX) 25 MG tablet, TAKE 1 TABLET BY MOUTH THREE TIMES A DAY AS NEEDED FOR ITCHING, Disp: 90 tablet, Rfl: 0    levonorgestrel-ethinyl estradiol (Larissia) 0.1-20 MG-MCG per tablet, Take 1 tablet by mouth Daily., Disp: 84 tablet, Rfl: 3    dicyclomine (BENTYL) 10 MG capsule, Take 1 capsule by mouth 4 (Four) Times a Day Before Meals & at Bedtime., Disp: 120 capsule, Rfl: 1      Immunization History   Administered Date(s) Administered     "31-influenza Vac Quardvalent Preservativ 10/05/2020    COVID-19 (PFIZER) Purple Cap Monovalent 04/12/2021, 05/03/2021    COVID-19 F23 (PFIZER) 12YRS+ (COMIRNATY) 11/14/2023    Covid-19 (Pfizer) Gray Cap Monovalent 02/14/2022    Fluzone (or Fluarix & Flulaval for VFC) >6mos 11/14/2023    Hep A, 2 Dose 03/14/2018, 09/20/2018    Hpv9 01/06/2020, 01/28/2021, 05/24/2021    Meningococcal MCV4P (Menactra) 08/02/2018    Tdap 05/02/2022         Objective       Vitals:    02/19/24 1406   BP: 127/78   BP Location: Right arm   Patient Position: Sitting   Cuff Size: Adult   Pulse: 96   Temp: 98.4 °F (36.9 °C)   TempSrc: Temporal   SpO2: 99%   Weight: 97.6 kg (215 lb 3.2 oz)   Height: 167.6 cm (66\")   PainSc: 0-No pain      Body mass index is 34.73 kg/m².   Wt Readings from Last 3 Encounters:   02/19/24 97.6 kg (215 lb 3.2 oz)   02/12/24 98.4 kg (217 lb)   12/12/23 94.5 kg (208 lb 4.8 oz)      BP Readings from Last 3 Encounters:   02/19/24 127/78   02/12/24 128/83   12/12/23 131/76                Physical Exam  Vitals reviewed.   Constitutional:       Appearance: Normal appearance.   HENT:      Head: Normocephalic and atraumatic.   Eyes:      Conjunctiva/sclera: Conjunctivae normal.      Pupils: Pupils are equal, round, and reactive to light.   Genitourinary:     Comments: One small internal hemorrhoid noted at 6:00  Hemoccult negative  Skin:     General: Skin is warm and dry.   Neurological:      Mental Status: She is alert and oriented to person, place, and time.   Psychiatric:         Mood and Affect: Mood normal.         Behavior: Behavior normal.         Thought Content: Thought content normal.         Judgment: Judgment normal.             Result Review :       Common labs          3/15/2023    08:55   Common Labs   Glucose 86    BUN 12    Creatinine 0.76    Sodium 140    Potassium 4.3    Chloride 104    Calcium 10.0    Total Protein 7.2    Albumin 4.6    Total Bilirubin 0.2    Alkaline Phosphatase 63    AST (SGOT) 20  "   ALT (SGPT) 17    Total Cholesterol 242    Triglycerides 68    HDL Cholesterol 76    LDL Cholesterol  155    Hemoglobin A1C 4.80                       Assessment and Plan        Diagnoses and all orders for this visit:    1. Bright red rectal bleeding (Primary)  -     CBC & Differential  -     POCT occult blood x 1 stool    2. SUN (generalized anxiety disorder)  Comments:  Increased Lexapro to 20 mg  Orders:  -     escitalopram (LEXAPRO) 20 MG tablet; Take 1 tablet by mouth Daily.  Dispense: 90 tablet; Refill: 3    3. Recurrent major depressive disorder, remission status unspecified  -     escitalopram (LEXAPRO) 20 MG tablet; Take 1 tablet by mouth Daily.  Dispense: 90 tablet; Refill: 3    4. Diarrhea, unspecified type  -     dicyclomine (BENTYL) 10 MG capsule; Take 1 capsule by mouth 4 (Four) Times a Day Before Meals & at Bedtime.  Dispense: 120 capsule; Refill: 1          Follow Up     Return if symptoms worsen or fail to improve.    Patient was given instructions and counseling regarding her condition or for health maintenance advice. Please see specific information pulled into the AVS if appropriate.     DEEPTI Francis

## 2024-02-21 ENCOUNTER — OFFICE VISIT (OUTPATIENT)
Dept: GASTROENTEROLOGY | Facility: CLINIC | Age: 24
End: 2024-02-21
Payer: COMMERCIAL

## 2024-02-21 VITALS
WEIGHT: 218.4 LBS | DIASTOLIC BLOOD PRESSURE: 71 MMHG | BODY MASS INDEX: 35.1 KG/M2 | SYSTOLIC BLOOD PRESSURE: 123 MMHG | HEIGHT: 66 IN | HEART RATE: 85 BPM

## 2024-02-21 DIAGNOSIS — K62.5 RECTAL BLEEDING: Primary | ICD-10-CM

## 2024-02-21 DIAGNOSIS — K59.04 CHRONIC IDIOPATHIC CONSTIPATION: ICD-10-CM

## 2024-02-21 RX ORDER — DOCUSATE SODIUM 100 MG/1
100 CAPSULE, LIQUID FILLED ORAL 2 TIMES DAILY
Qty: 60 CAPSULE | Refills: 3 | Status: SHIPPED | OUTPATIENT
Start: 2024-02-21

## 2024-02-21 NOTE — PROGRESS NOTES
Patient Name: Maryam Alvarez   Visit Date: 02/21/2024   Patient ID: [id number]  Provider: DEEPTI Wang    Sex: female  Location:  Location Address:  Location Phone: [phone]  [place] [address] [phone]    YOB: 2000  Age: 23 y.o.   Primary Care Provider Oksana Magana APRN      Referring Provider: No ref. provider found        Chief Complaint  Rectal Bleeding (Pt had an episode about 2 years ago, had subsided and has returned a couple of months ago. Having blood in stool 1 episode weekly since)    History of Present Illness    New pt states she has a hx of rectal bleeding (with wiping only) for 2 yrs, states resolved after she started Miralax, but stopped taking 3 months ago b/c she got tired of using it. Does have hard stools without it - Lometa #1-2. Has seen on stool a few times  Used external cream in Dec and blood improved and external hemorrhoid seemed to resolve. Pt does not feel external hemorrhoid now, last saw blood 1 week ago in stool. Noted normal CBC this week.   No abd pain or other GI sx.   Past Medical History:   Diagnosis Date    Anxiety     Breast mass, right        Past Surgical History:   Procedure Laterality Date    WISDOM TOOTH EXTRACTION         Allergies   Allergen Reactions    Zoloft [Sertraline Hcl] Itching       Family History   Problem Relation Age of Onset    No Known Problems Mother     No Known Problems Father     Multiple sclerosis Maternal Grandmother     Meniere's disease Maternal Grandmother     No Known Problems Maternal Grandfather     No Known Problems Paternal Grandmother     No Known Problems Paternal Grandfather     Colon cancer Neg Hx         Social History     Tobacco Use    Smoking status: Never    Smokeless tobacco: Never   Vaping Use    Vaping Use: Never used   Substance Use Topics    Alcohol use: No    Drug use: No       Objective     Vital Signs:   /71 (BP Location: Right arm, Patient Position: Sitting, Cuff  "Size: Adult)   Pulse 85   Ht 167.6 cm (66\")   Wt 99.1 kg (218 lb 6.4 oz)   BMI 35.25 kg/m²       Physical Exam  Constitutional:       General: The patient is not in acute distress.     Appearance: Normal appearance.   HENT:      Head: Normocephalic and atraumatic.      Nose: Nose normal.   Pulmonary:      Effort: Pulmonary effort is normal. No respiratory distress.   Abdominal:      General: Abdomen is flat.      Palpations: Abdomen is soft. There is no mass.      Tenderness: There is no abdominal tenderness. There is no guarding.   Musculoskeletal:      Cervical back: Neck supple.      Right lower leg: No edema.      Left lower leg: No edema.   Skin:     General: Skin is warm and dry.   Neurological:      General: No focal deficit present.      Mental Status: The patient is alert and oriented to person, place, and time.      Gait: Gait normal.   Psychiatric:         Mood and Affect: Mood normal.         Speech: Speech normal.         Behavior: Behavior normal.         Thought Content: Thought content normal.     Result Review :   The following data was reviewed by: DEEPTI Wang on 02/21/2024:    CBC w/diff          2/19/2024    15:18   CBC w/Diff   WBC 7.42    RBC 4.47    Hemoglobin 13.4    Hematocrit 39.3    MCV 87.9    MCH 30.0    MCHC 34.1    RDW 11.9    Platelets 223    Neutrophil Rel % 72.0    Immature Granulocyte Rel % 0.3    Lymphocyte Rel % 20.2    Monocyte Rel % 5.0    Eosinophil Rel % 1.8    Basophil Rel % 0.7      CMP          3/15/2023    08:55   CMP   Glucose 86    BUN 12    Creatinine 0.76    Sodium 140    Potassium 4.3    Chloride 104    Calcium 10.0    Total Protein 7.2    Albumin 4.6    Globulin 2.6    Total Bilirubin 0.2    Alkaline Phosphatase 63    AST (SGOT) 20    ALT (SGPT) 17    BUN/Creatinine Ratio 15.8                    Assessment and Plan    Diagnoses and all orders for this visit:    1. Rectal bleeding (Primary)  -     Case Request; Standing  -     Case Request    2. " Chronic idiopathic constipation  -     Case Request; Standing  -     Case Request    Other orders  -     polyethylene glycol (GoLYTELY) 236 g solution; Take per office instructions  Dispense: 4000 mL; Refill: 0  -     Follow Anesthesia Guidelines / Protocol; Standing  -     Follow Anesthesia Guidelines / Protocol; Future  -     Obtain Informed Consent; Future  -     Verify NPO; Standing  -     Obtain Informed Consent; Standing  -     POC Urine Pregnancy; Standing  -     docusate sodium (Colace) 100 MG capsule; Take 1 capsule by mouth 2 (Two) Times a Day.  Dispense: 60 capsule; Refill: 3              Follow Up   Return if symptoms worsen or fail to improve.  Trial Colace 100 mg 1-2 x day, instead of Miralax   Colonoscopy Surgical Risk and Benefits: Possible risks/complications, benefits, and alternatives to surgical or invasive procedure have been explained to patient and/or legal guardian; risks include bleeding, infection, and perforation. Patient has been evaluated and can tolerate anesthesia and/or sedation. Risks, benefits, and alternatives to anesthesia and sedation have been explained to patient and/or legal guardian.   Call if any sx change or worsen    Patient was given instructions and counseling regarding her condition or for health maintenance advice. Please see specific information pulled into the AVS if appropriate.

## 2024-03-06 ENCOUNTER — PATIENT MESSAGE (OUTPATIENT)
Dept: GASTROENTEROLOGY | Facility: CLINIC | Age: 24
End: 2024-03-06
Payer: COMMERCIAL

## 2024-03-06 NOTE — TELEPHONE ENCOUNTER
From: Maryam Alvarez  To: Natalia Sarabia  Sent: 3/6/2024 12:24 PM EST  Subject: Dulcolax    Hi there, saw you recently and you prescribes me dulcolax.. can’t remember the exact name of pill but it’s the generic version. I have been taking one a day since I saw you and It doesn’t seem to be doing much for me. Still having hard stools. Is there something else I can try in pill form? I hate taking miralax. Thanks

## 2024-03-29 ENCOUNTER — TELEPHONE (OUTPATIENT)
Dept: GASTROENTEROLOGY | Facility: CLINIC | Age: 24
End: 2024-03-29

## 2024-03-29 NOTE — TELEPHONE ENCOUNTER
Caller: Maryam Alvarez    Relationship to patient: Self    Best call back number: [phone]; AFTER 1 PM; OK TO LVM    Chief complaint: N/A    Type of visit: COLONOSCOPY    Requested date: AFTER 04/30/24     If rescheduling, when is the original appointment: 04/30/24     Additional notes:PLEASE CALL FOR SCHEDULING.

## 2024-08-09 ENCOUNTER — TELEPHONE (OUTPATIENT)
Dept: GASTROENTEROLOGY | Facility: CLINIC | Age: 24
End: 2024-08-09
Payer: COMMERCIAL

## 2024-08-09 NOTE — TELEPHONE ENCOUNTER
Provider: DR. MAK    Caller: ADITI GEIGER    Relationship to Patient: SELF    Phone Number: [phone]    Reason for Call: PT CALLED TO RESCHEDULE 9/9/2024 PROCEDURE APPT// PLEASE CALL PT BACK

## 2024-08-09 NOTE — TELEPHONE ENCOUNTER
Maryam Liliasaeed Alvarez  2000    Patient requested to Reschedule their Colonoscopy. I have offered to reschedule this patient and patient has agreed. Patient has been rescheduled to 10.28.24 @ 7:30am.    Reason for cancelling/rescheduling: No Reason Given    This procedure was ordered by DEEPTI Lugo for an important reason. We want to inform you that there are risks associated with not proceeding with the procedure at this time such as a delay in diagnosis, risk of incurable disease, or cancer.    Updated clearance needed?: No    Is the patient currently on any injectable medications for weight loss or diabetes? No    Patient verbalized understanding for all of the above information.

## 2024-08-12 ENCOUNTER — OFFICE VISIT (OUTPATIENT)
Dept: FAMILY MEDICINE CLINIC | Facility: CLINIC | Age: 24
End: 2024-08-12
Payer: COMMERCIAL

## 2024-08-12 VITALS
BODY MASS INDEX: 36.07 KG/M2 | HEIGHT: 66 IN | TEMPERATURE: 98.6 F | HEART RATE: 103 BPM | SYSTOLIC BLOOD PRESSURE: 132 MMHG | OXYGEN SATURATION: 98 % | WEIGHT: 224.4 LBS | DIASTOLIC BLOOD PRESSURE: 78 MMHG

## 2024-08-12 DIAGNOSIS — Z13.29 THYROID DISORDER SCREENING: ICD-10-CM

## 2024-08-12 DIAGNOSIS — Z13.1 DIABETES MELLITUS SCREENING: ICD-10-CM

## 2024-08-12 DIAGNOSIS — Z13.220 LIPID SCREENING: ICD-10-CM

## 2024-08-12 DIAGNOSIS — F41.1 GAD (GENERALIZED ANXIETY DISORDER): Primary | ICD-10-CM

## 2024-08-12 LAB
BASOPHILS # BLD AUTO: 0.06 10*3/MM3 (ref 0–0.2)
BASOPHILS NFR BLD AUTO: 0.8 % (ref 0–1.5)
CHOLEST SERPL-MCNC: 239 MG/DL (ref 0–200)
DEPRECATED RDW RBC AUTO: 42.4 FL (ref 37–54)
EOSINOPHIL # BLD AUTO: 1.1 10*3/MM3 (ref 0–0.4)
EOSINOPHIL NFR BLD AUTO: 14.6 % (ref 0.3–6.2)
ERYTHROCYTE [DISTWIDTH] IN BLOOD BY AUTOMATED COUNT: 13.5 % (ref 12.3–15.4)
HCT VFR BLD AUTO: 40.4 % (ref 34–46.6)
HDLC SERPL-MCNC: 70 MG/DL (ref 40–60)
HGB BLD-MCNC: 13.1 G/DL (ref 12–15.9)
IMM GRANULOCYTES # BLD AUTO: 0.02 10*3/MM3 (ref 0–0.05)
IMM GRANULOCYTES NFR BLD AUTO: 0.3 % (ref 0–0.5)
LDLC SERPL CALC-MCNC: 155 MG/DL (ref 0–100)
LDLC/HDLC SERPL: 2.19 {RATIO}
LYMPHOCYTES # BLD AUTO: 1.37 10*3/MM3 (ref 0.7–3.1)
LYMPHOCYTES NFR BLD AUTO: 18.1 % (ref 19.6–45.3)
MCH RBC QN AUTO: 28.1 PG (ref 26.6–33)
MCHC RBC AUTO-ENTMCNC: 32.4 G/DL (ref 31.5–35.7)
MCV RBC AUTO: 86.5 FL (ref 79–97)
MONOCYTES # BLD AUTO: 0.25 10*3/MM3 (ref 0.1–0.9)
MONOCYTES NFR BLD AUTO: 3.3 % (ref 5–12)
NEUTROPHILS NFR BLD AUTO: 4.75 10*3/MM3 (ref 1.7–7)
NEUTROPHILS NFR BLD AUTO: 62.9 % (ref 42.7–76)
NRBC BLD AUTO-RTO: 0 /100 WBC (ref 0–0.2)
PLATELET # BLD AUTO: 209 10*3/MM3 (ref 140–450)
PMV BLD AUTO: 11.3 FL (ref 6–12)
RBC # BLD AUTO: 4.67 10*6/MM3 (ref 3.77–5.28)
TRIGL SERPL-MCNC: 79 MG/DL (ref 0–150)
TSH SERPL DL<=0.05 MIU/L-ACNC: 1.52 UIU/ML (ref 0.27–4.2)
VLDLC SERPL-MCNC: 14 MG/DL (ref 5–40)
WBC NRBC COR # BLD AUTO: 7.55 10*3/MM3 (ref 3.4–10.8)

## 2024-08-12 PROCEDURE — 99214 OFFICE O/P EST MOD 30 MIN: CPT | Performed by: NURSE PRACTITIONER

## 2024-08-12 PROCEDURE — 80053 COMPREHEN METABOLIC PANEL: CPT | Performed by: NURSE PRACTITIONER

## 2024-08-12 PROCEDURE — 36415 COLL VENOUS BLD VENIPUNCTURE: CPT | Performed by: NURSE PRACTITIONER

## 2024-08-12 PROCEDURE — 80061 LIPID PANEL: CPT | Performed by: NURSE PRACTITIONER

## 2024-08-12 PROCEDURE — 84443 ASSAY THYROID STIM HORMONE: CPT | Performed by: NURSE PRACTITIONER

## 2024-08-12 PROCEDURE — 85025 COMPLETE CBC W/AUTO DIFF WBC: CPT | Performed by: NURSE PRACTITIONER

## 2024-08-12 RX ORDER — DESVENLAFAXINE SUCCINATE 50 MG/1
50 TABLET, EXTENDED RELEASE ORAL DAILY
Qty: 90 TABLET | Refills: 1 | Status: SHIPPED | OUTPATIENT
Start: 2024-08-12

## 2024-08-12 NOTE — PROGRESS NOTES
Chief Complaint  Anxiety and Hemorrhoids    Subjective          Maryam Alvarez is a 23 y.o. female who presents to [facility]    History of Present Illness    States her anxiety has gotten worse over the last 6 months.  She states she worries about everything all the time.  Feels like she is in a loop.  Has been on Lexapro 6 months, felt like it worked before but now it doesn't.  Has tried sertraline, fluoxetine and paroxetine.       PHQ-2 Total Score: 0   PHQ-9 Total Score: 0        Review of Systems   Respiratory:  Negative for shortness of breath.    Cardiovascular:  Positive for chest pain. Negative for palpitations.   Gastrointestinal:  Positive for nausea.   Neurological:  Negative for dizziness.   Psychiatric/Behavioral:  Negative for confusion.           Medical History: has a past medical history of Anxiety and Breast mass, right.     Surgical History: has a past surgical history that includes [place] tooth extraction.     Family History: family history includes Meniere's disease in her maternal grandmother; Multiple sclerosis in her maternal grandmother; No Known Problems in her father, maternal grandfather, mother, paternal grandfather, and paternal grandmother.     Social History: reports that she has never smoked. She has never used smokeless tobacco. She reports that she does not drink alcohol and does not use drugs.    Allergies: Zoloft [sertraline hcl]      Health Maintenance Due   Topic Date Due    CHLAMYDIA SCREENING  04/11/2023    ANNUAL PHYSICAL  05/02/2023    INFLUENZA VACCINE  08/01/2024            Current Outpatient Medications:     Hydrocortisone, Perianal, (ANUSOL-HC) 2.5 % rectal cream, Insert  into the rectum 2 (Two) Times a Day., Disp: 28 g, Rfl: 1    hydrOXYzine (ATARAX) 25 MG tablet, TAKE 1 TABLET BY MOUTH THREE TIMES A DAY AS NEEDED FOR ITCHING, Disp: 90 tablet, Rfl: 0    levonorgestrel-ethinyl estradiol (Larissia) 0.1-20 MG-MCG per tablet, Take 1  "tablet by mouth Daily., Disp: 84 tablet, Rfl: 3    polyethylene glycol (GoLYTELY) 236 g solution, Take per office instructions, Disp: 4000 mL, Rfl: 0    Saccharomyces boulardii (DIGESTIVE PROBIOTIC PO), Take  by mouth., Disp: , Rfl:     desvenlafaxine (PRISTIQ) 50 MG 24 hr tablet, Take 1 tablet by mouth Daily., Disp: 90 tablet, Rfl: 1      Immunization History   Administered Date(s) Administered    31-influenza Vac Quardvalent Preservativ 10/05/2020    COVID-19 (PFIZER) Purple Cap Monovalent 04/12/2021, 05/03/2021    COVID-19 F23 (PFIZER) 12YRS+ (COMIRNATY) 11/14/2023    Covid-19 (Pfizer) Gray Cap Monovalent 02/14/2022    Fluzone (or Fluarix & Flulaval for VFC) >6mos 11/14/2023    Hep A, 2 Dose 03/14/2018, 09/20/2018    Hpv9 01/06/2020, 01/28/2021, 05/24/2021    Meningococcal MCV4P (Menactra) 08/02/2018    Tdap 05/02/2022         Objective       Vitals:    08/12/24 1123   BP: 132/78   BP Location: Right arm   Patient Position: Sitting   Cuff Size: Adult   Pulse: 103   Temp: 98.6 °F (37 °C)   TempSrc: Temporal   SpO2: 98%   Weight: 102 kg (224 lb 6.4 oz)   Height: 167.6 cm (65.98\")   PainSc: 0-No pain      Body mass index is 36.24 kg/m².   Wt Readings from Last 3 Encounters:   08/12/24 102 kg (224 lb 6.4 oz)   02/21/24 99.1 kg (218 lb 6.4 oz)   02/19/24 97.6 kg (215 lb 3.2 oz)      BP Readings from Last 3 Encounters:   08/12/24 132/78   02/21/24 123/71   02/19/24 127/78                Physical Exam  Vitals reviewed.   Constitutional:       Appearance: Normal appearance.   Cardiovascular:      Rate and Rhythm: Normal rate and regular rhythm.      Pulses: Normal pulses.      Heart sounds: Normal heart sounds.   Pulmonary:      Effort: Pulmonary effort is normal.      Breath sounds: Normal breath sounds.   Genitourinary:     Comments: Tiny ext hemorrhoid at 6 oclock, soft   Skin:     General: Skin is warm and dry.   Neurological:      Mental Status: She is alert and oriented to person, place, and time.   Psychiatric:    "      Mood and Affect: Mood normal.         Behavior: Behavior normal.         Thought Content: Thought content normal.         Judgment: Judgment normal.             Result Review :       Common labs          2/19/2024    15:18   Common Labs   WBC 7.42    Hemoglobin 13.4    Hematocrit 39.3    Platelets 223                       Assessment and Plan        Diagnoses and all orders for this visit:    1. SUN (generalized anxiety disorder) (Primary)  Comments:  Stop Lexapro, start Cymbalta 60 mg a day  Orders:  -     desvenlafaxine (PRISTIQ) 50 MG 24 hr tablet; Take 1 tablet by mouth Daily.  Dispense: 90 tablet; Refill: 1    2. Diabetes mellitus screening  -     Comprehensive Metabolic Panel    3. Lipid screening  -     CBC & Differential  -     Lipid Panel    4. Thyroid disorder screening  -     TSH          Follow Up     Return in about 6 weeks (around 9/23/2024), or if symptoms worsen or fail to improve.    Patient was given instructions and counseling regarding her condition or for health maintenance advice. Please see specific information pulled into the AVS if appropriate. Patient understands the importance of having any ordered tests to be performed in a timely fashion.      I spent 10 minutes caring for Maryam on this date of service. This time includes time spent by me in the following activities: preparing for the visit, reviewing tests, performing a medically appropriate examination and/or evaluation, counseling and educating the patient/family/caregiver, referring and communicating with other health care professionals, documenting information in the medical record, independently interpreting results and communicating that information with the patient/family/caregiver, ordering medications, and ordering test(s)      DEEPTI Francis

## 2024-08-13 LAB
ALBUMIN SERPL-MCNC: 4.3 G/DL (ref 3.5–5.2)
ALBUMIN/GLOB SERPL: 1.3 G/DL
ALP SERPL-CCNC: 86 U/L (ref 39–117)
ALT SERPL W P-5'-P-CCNC: 15 U/L (ref 1–33)
ANION GAP SERPL CALCULATED.3IONS-SCNC: 12.8 MMOL/L (ref 5–15)
AST SERPL-CCNC: 24 U/L (ref 1–32)
BILIRUB SERPL-MCNC: 0.3 MG/DL (ref 0–1.2)
BUN SERPL-MCNC: 8 MG/DL (ref 6–20)
BUN/CREAT SERPL: 12.5 (ref 7–25)
CALCIUM SPEC-SCNC: 9.8 MG/DL (ref 8.6–10.5)
CHLORIDE SERPL-SCNC: 101 MMOL/L (ref 98–107)
CO2 SERPL-SCNC: 23.2 MMOL/L (ref 22–29)
CREAT SERPL-MCNC: 0.64 MG/DL (ref 0.57–1)
EGFRCR SERPLBLD CKD-EPI 2021: 127.5 ML/MIN/1.73
GLOBULIN UR ELPH-MCNC: 3.3 GM/DL
GLUCOSE SERPL-MCNC: 73 MG/DL (ref 65–99)
POTASSIUM SERPL-SCNC: 4 MMOL/L (ref 3.5–5.2)
PROT SERPL-MCNC: 7.6 G/DL (ref 6–8.5)
SODIUM SERPL-SCNC: 137 MMOL/L (ref 136–145)

## 2024-09-25 ENCOUNTER — TELEPHONE (OUTPATIENT)
Dept: GASTROENTEROLOGY | Facility: CLINIC | Age: 24
End: 2024-09-25
Payer: COMMERCIAL

## 2024-10-02 ENCOUNTER — TELEPHONE (OUTPATIENT)
Dept: GASTROENTEROLOGY | Facility: CLINIC | Age: 24
End: 2024-10-02
Payer: COMMERCIAL

## 2024-10-02 NOTE — TELEPHONE ENCOUNTER
Maryam Rodrigues Antonio  2000    Patient requested to Reschedule their Colonoscopy. I have offered to reschedule this patient and patient has declined.     Reason for cancelling/rescheduling: pt feels like she doesn't need it any more.     This procedure was ordered by Randell Hardy MD for an important reason. We want to inform you that there are risks associated with not proceeding with the procedure at this time such as a delay in diagnosis, risk of incurable disease, or cancer.    Patient plans to call us back to reschedule: No    Updated clearance needed?: No      Is the patient currently on any injectable medications for weight loss or diabetes? No        Patient verbalized understanding for all of the above information.

## 2024-11-06 ENCOUNTER — OFFICE VISIT (OUTPATIENT)
Dept: FAMILY MEDICINE CLINIC | Facility: CLINIC | Age: 24
End: 2024-11-06
Payer: COMMERCIAL

## 2024-11-06 VITALS
OXYGEN SATURATION: 98 % | SYSTOLIC BLOOD PRESSURE: 136 MMHG | HEART RATE: 92 BPM | HEIGHT: 66 IN | DIASTOLIC BLOOD PRESSURE: 76 MMHG | TEMPERATURE: 98.4 F | BODY MASS INDEX: 36.42 KG/M2 | WEIGHT: 226.6 LBS

## 2024-11-06 DIAGNOSIS — N63.41 SUBAREOLAR MASS OF RIGHT BREAST: Primary | ICD-10-CM

## 2024-11-06 PROCEDURE — 99213 OFFICE O/P EST LOW 20 MIN: CPT | Performed by: STUDENT IN AN ORGANIZED HEALTH CARE EDUCATION/TRAINING PROGRAM

## 2024-11-06 NOTE — PROGRESS NOTES
"Chief Complaint  Fibromyalgia and Hemorrhoids    Subjective      Maryam Alvarez is a 24 y.o. female who presents to [facility]     History of Present Illness  The patient presents for follow-up of fibroadenoma.    She has a history of fibroadenoma and suspects the presence of another one. An ultrasound was performed, and a biopsy was conducted by a breast surgeon in 2022. She noticed another lump in a different location, but an ultrasound revealed it to be just tissue. This is her third time having a lump examined. A She reports no abnormal nipple discharge, nipple retraction, skin changes over the breast, or tenderness. The lump was first noticed approximately 2 months ago. She has never been pregnant.    FAMILY HISTORY  She denies any family history of breast cancer.       Patient Care Team:  Oksana Magana APRN as PCP - General (Nurse Practitioner)      Review of Systems   Skin:  Negative for rash and wound.        Subjective palpable right breast lump         Objective   Vital Signs:   Vitals:    11/06/24 1405   BP: 136/76   BP Location: Left arm   Patient Position: Sitting   Cuff Size: Adult   Pulse: 92   Temp: 98.4 °F (36.9 °C)   TempSrc: Temporal   SpO2: 98%   Weight: 103 kg (226 lb 9.6 oz)   Height: 167.6 cm (65.98\")     Body mass index is 36.59 kg/m².    Wt Readings from Last 3 Encounters:   11/06/24 103 kg (226 lb 9.6 oz)   08/24/24 101 kg (223 lb 11.2 oz)   08/12/24 102 kg (224 lb 6.4 oz)     BP Readings from Last 3 Encounters:   11/06/24 136/76   08/24/24 129/88   08/12/24 132/78       Health Maintenance   Topic Date Due    CHLAMYDIA SCREENING  04/11/2023    ANNUAL PHYSICAL  05/02/2023    INFLUENZA VACCINE  08/01/2024    COVID-19 Vaccine (5 - 2024-25 season) 09/01/2024    BMI FOLLOWUP  12/12/2024    PAP SMEAR  02/12/2027    TDAP/TD VACCINES (2 - Td or Tdap) 05/02/2032    HEPATITIS C SCREENING  Completed    HPV VACCINES  Completed    Pneumococcal Vaccine 0-64  " Aged Out       Physical Exam  Cardiovascular:      Rate and Rhythm: Normal rate and regular rhythm.      Pulses: Normal pulses.      Heart sounds: Normal heart sounds.   Pulmonary:      Effort: Pulmonary effort is normal.      Breath sounds: Normal breath sounds.   Chest:          Comments: Palpable subareolar dense breast tissue, easily mobile, nontender, no nipple retraction, erythema or discharge noted  Abdominal:      Palpations: Abdomen is soft.       Physical Exam         Result Review   The following data was reviewed by: Meghan العلي MD on 11/06/2024:  [x]  Tests & Results  []  Hospitalization/Emergency Department/Urgent Care  []  Internal/External Consultant Notes      Results  Imaging  Ultrasound of the breast showed normal tissue.       ASSESSMENT/PLAN  Diagnoses and all orders for this visit:    1. Subareolar mass of right breast (Primary)  -     US Breast Right Limited; Future        Assessment & Plan  1. Fibroadenoma.  The examination revealed a palpable, most likely subareolar dense breast tissue in the right breast. An ultrasound will be ordered for further evaluation to ensure there are no underlying issues. She has no abnormal discharge, nipple retraction, skin changes, or tenderness. There is no strong family history of breast cancer.     Maryam Rodrigues Alvarez  reports that she has never smoked. She has never used smokeless tobacco.          FOLLOW UP  Return if symptoms worsen or fail to improve.  Patient was given instructions and counseling regarding her condition or for health maintenance advice. Please see specific information pulled into the AVS if appropriate.       Meghan العلي MD  11/06/24  14:25 EST    Patient or patient representative verbalized consent for the use of Ambient Listening during the visit with  Meghan العلي MD for chart documentation. 11/6/2024  14:25 EST        Answers submitted by the patient for this visit:  Other (Submitted on 11/5/2024)  Please describe your  symptoms.: I have a possible fibroadenoma in my right breast and a possible hemorrhoid i would like looked at as well  Have you had these symptoms before?: Yes  How long have you been having these symptoms?: Greater than 2 weeks  Please list any medications you are currently taking for this condition.: hemorrhoid cream for hemorrhoid, nothing for fibroadenoma  Please describe any probable cause for these symptoms. : Not sure  Primary Reason for Visit (Submitted on 11/5/2024)  What is the primary reason for your visit?: Problem Not Listed

## 2024-11-12 ENCOUNTER — PATIENT MESSAGE (OUTPATIENT)
Dept: GASTROENTEROLOGY | Facility: CLINIC | Age: 24
End: 2024-11-12
Payer: COMMERCIAL

## 2024-11-18 ENCOUNTER — TELEPHONE (OUTPATIENT)
Dept: GASTROENTEROLOGY | Facility: CLINIC | Age: 24
End: 2024-11-18
Payer: COMMERCIAL

## 2024-11-18 NOTE — TELEPHONE ENCOUNTER
Caller: Maryam Alvarez    Relationship to patient: Self    Best call back number: [phone]    Chief complaint: R/S COLONOSCOPY     Type of visit: COLONOSCOPY     Requested date:      If rescheduling, when is the original appointment: 1.23     Additional notes:

## 2024-11-18 NOTE — TELEPHONE ENCOUNTER
Maryam Rodrigues Antonio  2000    Patient requested to Reschedule their Colonoscopy. I have offered to reschedule this patient and patient has agreed. Patient has been rescheduled to 1.27.25 @ 8:00 am.    Reason for cancelling/rescheduling: She will be out of town    This procedure was ordered by DEEPTI Lugo for an important reason. We want to inform you that there are risks associated with not proceeding with the procedure at this time such as a delay in diagnosis, risk of incurable disease, or cancer.    Updated clearance needed?: No    Is the patient currently on any injectable medications for weight loss or diabetes? No    Patient verbalized understanding for all of the above information.    I did stress the importance of keeping this appt. Patient has rescheduled at least 4 times. I advised that if she needs to reschedule again, we will need to review with our provider before rescheduling. Pt verbalized understanding.

## 2024-11-22 ENCOUNTER — HOSPITAL ENCOUNTER (OUTPATIENT)
Dept: ULTRASOUND IMAGING | Facility: HOSPITAL | Age: 24
Discharge: HOME | End: 2024-11-22
Admitting: STUDENT IN AN ORGANIZED HEALTH CARE EDUCATION/TRAINING PROGRAM
Payer: COMMERCIAL

## 2024-11-22 ENCOUNTER — TELEPHONE (OUTPATIENT)
Dept: GASTROENTEROLOGY | Facility: CLINIC | Age: 24
End: 2024-11-22
Payer: COMMERCIAL

## 2024-11-22 DIAGNOSIS — N63.41 SUBAREOLAR MASS OF RIGHT BREAST: ICD-10-CM

## 2024-11-22 PROCEDURE — 76642 ULTRASOUND BREAST LIMITED: CPT

## 2024-11-22 NOTE — TELEPHONE ENCOUNTER
Attempted to contact patient to see if they wanted to reschedule at this time. Spoke with patient and she doesn't think the appointment is necessary and doesn't want to reschedule.

## 2024-12-11 DIAGNOSIS — Z30.011 OCP (ORAL CONTRACEPTIVE PILLS) INITIATION: ICD-10-CM

## 2024-12-11 RX ORDER — LEVONORGESTREL AND ETHINYL ESTRADIOL 0.1-0.02MG
1 KIT ORAL DAILY
Qty: 84 TABLET | Refills: 3 | Status: SHIPPED | OUTPATIENT
Start: 2024-12-11

## 2025-01-14 ENCOUNTER — PATIENT MESSAGE (OUTPATIENT)
Dept: GASTROENTEROLOGY | Facility: CLINIC | Age: 25
End: 2025-01-14
Payer: COMMERCIAL

## 2025-01-14 DIAGNOSIS — F41.1 GAD (GENERALIZED ANXIETY DISORDER): ICD-10-CM

## 2025-01-14 RX ORDER — DESVENLAFAXINE 50 MG/1
50 TABLET, FILM COATED, EXTENDED RELEASE ORAL DAILY
Qty: 90 TABLET | Refills: 1 | Status: SHIPPED | OUTPATIENT
Start: 2025-01-14

## 2025-01-24 ENCOUNTER — TELEPHONE (OUTPATIENT)
Dept: GASTROENTEROLOGY | Facility: CLINIC | Age: 25
End: 2025-01-24
Payer: COMMERCIAL

## 2025-01-24 ENCOUNTER — ANESTHESIA EVENT (OUTPATIENT)
Dept: GASTROENTEROLOGY | Facility: HOSPITAL | Age: 25
End: 2025-01-24
Payer: COMMERCIAL

## 2025-01-24 NOTE — ANESTHESIA PREPROCEDURE EVALUATION
Anesthesia Evaluation     Patient summary reviewed and Nursing notes reviewed   NPO Solid Status: > 8 hours  NPO Liquid Status: > 4 hours           Airway   Mallampati: I  TM distance: >3 FB  Neck ROM: full  No difficulty expected  Dental - normal exam     Pulmonary - normal exam    breath sounds clear to auscultation  Cardiovascular - normal exam  Exercise tolerance: good (4-7 METS)    ECG reviewed  Rhythm: regular  Rate: normal        Neuro/Psych  (+) headaches, psychiatric history Anxiety and Depression  GI/Hepatic/Renal/Endo    (+) obesity, morbid obesity, GI bleeding resolved    Musculoskeletal     Abdominal    Substance History      OB/GYN          Other        ROS/Med Hx Other: Rectal bleed , constipation    EKG 10/23/21: ,   Sinus tachycardia  Borderline ST depression, anterolateral leads    HCG pending                    Anesthesia Plan    ASA 3     general   total IV anesthesia  (Total IV Anesthesia    Patient understands anesthesia not responsible for dental damage.      Discussed risks with pt including aspiration, allergic reactions, apnea, advanced airway placement. Pt verbalized understanding. All questions answered.     )  intravenous induction     Anesthetic plan, risks, benefits, and alternatives have been provided, discussed and informed consent has been obtained with: patient.  Pre-procedure education provided  Plan discussed with CRNA.      CODE STATUS:

## 2025-01-24 NOTE — TELEPHONE ENCOUNTER
Patient called to reschedule her procedure again with , patients order for colonoscopy was made in February of 2024, patient has not been seen in office since 2.21.2024. patient rescheduled 6 times in 2024 and can not be rescheduled at this time. Patient stated she would keep her appt for Monday.

## 2025-01-27 ENCOUNTER — ANESTHESIA (OUTPATIENT)
Dept: GASTROENTEROLOGY | Facility: HOSPITAL | Age: 25
End: 2025-01-27
Payer: COMMERCIAL

## 2025-01-27 ENCOUNTER — HOSPITAL ENCOUNTER (OUTPATIENT)
Facility: HOSPITAL | Age: 25
Setting detail: HOSPITAL OUTPATIENT SURGERY
Discharge: HOME | End: 2025-01-27
Attending: INTERNAL MEDICINE | Admitting: INTERNAL MEDICINE
Payer: COMMERCIAL

## 2025-01-27 VITALS
DIASTOLIC BLOOD PRESSURE: 58 MMHG | TEMPERATURE: 98 F | HEART RATE: 82 BPM | WEIGHT: 230.38 LBS | SYSTOLIC BLOOD PRESSURE: 105 MMHG | OXYGEN SATURATION: 100 % | BODY MASS INDEX: 37.2 KG/M2 | RESPIRATION RATE: 16 BRPM

## 2025-01-27 LAB — B-HCG UR QL: NEGATIVE

## 2025-01-27 PROCEDURE — 25810000003 LACTATED RINGERS PER 1000 ML

## 2025-01-27 PROCEDURE — 45378 DIAGNOSTIC COLONOSCOPY: CPT | Performed by: INTERNAL MEDICINE

## 2025-01-27 PROCEDURE — 81025 URINE PREGNANCY TEST: CPT | Performed by: INTERNAL MEDICINE

## 2025-01-27 PROCEDURE — 25010000002 PROPOFOL 10 MG/ML EMULSION: Performed by: NURSE ANESTHETIST, CERTIFIED REGISTERED

## 2025-01-27 RX ORDER — PROPOFOL 10 MG/ML
VIAL (ML) INTRAVENOUS AS NEEDED
Status: DISCONTINUED | OUTPATIENT
Start: 2025-01-27 | End: 2025-01-27 | Stop reason: SURG

## 2025-01-27 RX ORDER — SODIUM CHLORIDE, SODIUM LACTATE, POTASSIUM CHLORIDE, CALCIUM CHLORIDE 600; 310; 30; 20 MG/100ML; MG/100ML; MG/100ML; MG/100ML
30 INJECTION, SOLUTION INTRAVENOUS CONTINUOUS
Status: DISCONTINUED | OUTPATIENT
Start: 2025-01-27 | End: 2025-01-27 | Stop reason: HOSPADM

## 2025-01-27 RX ADMIN — PROPOFOL 140 MG: 10 INJECTION, EMULSION INTRAVENOUS at 09:38

## 2025-01-27 RX ADMIN — PROPOFOL 250 MCG/KG/MIN: 10 INJECTION, EMULSION INTRAVENOUS at 09:38

## 2025-01-27 RX ADMIN — SODIUM CHLORIDE, POTASSIUM CHLORIDE, SODIUM LACTATE AND CALCIUM CHLORIDE 30 ML/HR: 600; 310; 30; 20 INJECTION, SOLUTION INTRAVENOUS at 09:06

## 2025-01-27 NOTE — H&P
Pre Procedure History & Physical    Chief Complaint:   Rectal bleeding    Subjective     HPI:   Rectal bleeding    Past Medical History:   Past Medical History:   Diagnosis Date    Anxiety     Breast mass, right        Past Surgical History:  Past Surgical History:   Procedure Laterality Date    WISDOM TOOTH EXTRACTION         Family History:  Family History   Problem Relation Age of Onset    No Known Problems Mother     No Known Problems Father     Multiple sclerosis Maternal Grandmother     Meniere's disease Maternal Grandmother     No Known Problems Maternal Grandfather     No Known Problems Paternal Grandmother     No Known Problems Paternal Grandfather     Colon cancer Neg Hx        Social History:   reports that she has never smoked. She has never used smokeless tobacco. She reports that she does not drink alcohol and does not use drugs.    Medications:   Medications Prior to Admission   Medication Sig Dispense Refill Last Dose/Taking    desvenlafaxine (PRISTIQ) 50 MG 24 hr tablet Take 1 tablet by mouth Daily. 90 tablet 1     Hydrocortisone, Perianal, (ANUSOL-HC) 2.5 % rectal cream Insert  into the rectum 2 (Two) Times a Day. 28 g 1     hydrOXYzine (ATARAX) 25 MG tablet TAKE 1 TABLET BY MOUTH THREE TIMES A DAY AS NEEDED FOR ITCHING 90 tablet 0     methylPREDNISolone (MEDROL) 4 MG dose pack Take as directed on package instructions. 21 tablet 0     polyethylene glycol (GoLYTELY) 236 g solution Take per office instructions 4000 mL 0     Saccharomyces boulardii (DIGESTIVE PROBIOTIC PO) Take  by mouth.       Sronyx 0.1-20 MG-MCG per tablet TAKE 1 TABLET BY MOUTH EVERY DAY 84 tablet 3        Allergies:  Zoloft [sertraline hcl]        Objective     Weight 104 kg (230 lb 6.1 oz), not currently breastfeeding.    Physical Exam   Constitutional: Pt is oriented to person, place, and time and well-developed, well-nourished, and in no distress.   Mouth/Throat: Oropharynx is clear and moist.   Neck: Normal range of motion.    Cardiovascular: Normal rate, regular rhythm and normal heart sounds.    Pulmonary/Chest: Effort normal and breath sounds normal.   Abdominal: Soft. Nontender  Skin: Skin is warm and dry.   Psychiatric: Mood, memory, affect and judgment normal.     Assessment & Plan     Diagnosis:  Rectal bleeding    Anticipated Surgical Procedure:  Colonoscopy    The risks, benefits, and alternatives of this procedure have been discussed with the patient or the responsible party- the patient understands and agrees to proceed.

## 2025-01-27 NOTE — ANESTHESIA POSTPROCEDURE EVALUATION
Patient: Maryam Alvarez    Procedure Summary       Date: 01/27/25 Room / Location: [facility] ENDOSCOPY 1 / [facility] ENDOSCOPY    Anesthesia Start: 0934 Anesthesia Stop: 1004    Procedure: COLONOSCOPY, WITH POSSIBLE BIOPSY Diagnosis:       Rectal bleeding      Chronic idiopathic constipation      (Rectal bleeding [K62.5])      (Chronic idiopathic constipation [K59.04])    Surgeons: Cliff Hardy MD Provider: Mi Doran CRNA    Anesthesia Type: general ASA Status: 3            Anesthesia Type: general    Vitals  Vitals Value Taken Time   /58 01/27/25 1019   Temp 36.7 °C (98 °F) 01/27/25 1017   Pulse 80 01/27/25 1021   Resp 16 01/27/25 1017   SpO2 100 % 01/27/25 1017   Vitals shown include unfiled device data.        Post Anesthesia Care and Evaluation    Patient location during evaluation: bedside  Patient participation: complete - patient participated  Level of consciousness: awake  Pain management: adequate    Airway patency: patent  Anesthetic complications: No anesthetic complications  PONV Status: controlled  Cardiovascular status: acceptable and stable  Respiratory status: acceptable

## 2025-01-30 ENCOUNTER — TELEPHONE (OUTPATIENT)
Dept: GASTROENTEROLOGY | Facility: CLINIC | Age: 25
End: 2025-01-30
Payer: COMMERCIAL

## 2025-01-30 NOTE — TELEPHONE ENCOUNTER
Colonoscopy 1/27/2025: Good prep, normal colonoscopy no specimens collected  No recall indicated  Patient should follow-up if still having any constipation or blood in the stool

## 2025-03-17 RX ORDER — HYDROCORTISONE 25 MG/G
CREAM TOPICAL 2 TIMES DAILY
Qty: 28 G | Refills: 1 | Status: SHIPPED | OUTPATIENT
Start: 2025-03-17

## 2025-03-19 ENCOUNTER — TELEPHONE (OUTPATIENT)
Dept: FAMILY MEDICINE CLINIC | Facility: CLINIC | Age: 25
End: 2025-03-19

## 2025-03-19 RX ORDER — HYDROCORTISONE 25 MG/G
1 OINTMENT TOPICAL 2 TIMES DAILY
Qty: 20 G | Refills: 2 | Status: SHIPPED | OUTPATIENT
Start: 2025-03-19

## 2025-03-19 NOTE — TELEPHONE ENCOUNTER
Caller: Antonio Maryam Rodrigues    Relationship: Self    Best call back number: [phone]    What medication are you requesting: HYDROCORTISONE 2.5% OINTMENT    What are your current symptoms: WRONG PRESCRIPTION WAS SENT IN.       If a prescription is needed, what is your preferred pharmacy and phone number:  PLEASE SEND TO [facility]. PHARMACY     Additional notes:PATIENT STATES SHE HAS BEEN PRESCRIBED THIS BEFORE, BUT IT IS NOT ON MEDICATION LIST.         DELETE AFTER READING TO PATIENT: “Thank you for sharing this information with me. I will send a message to the clinical team. Please allow 48 hours for the clinical staff to follow up on this request.”

## 2025-06-04 ENCOUNTER — OFFICE VISIT (OUTPATIENT)
Dept: FAMILY MEDICINE CLINIC | Facility: CLINIC | Age: 25
End: 2025-06-04
Payer: COMMERCIAL

## 2025-06-04 VITALS
HEART RATE: 100 BPM | WEIGHT: 227 LBS | BODY MASS INDEX: 36.48 KG/M2 | HEIGHT: 66 IN | TEMPERATURE: 97.8 F | DIASTOLIC BLOOD PRESSURE: 81 MMHG | OXYGEN SATURATION: 99 % | SYSTOLIC BLOOD PRESSURE: 119 MMHG

## 2025-06-04 DIAGNOSIS — Z00.00 ANNUAL PHYSICAL EXAM: Primary | ICD-10-CM

## 2025-06-04 LAB
ALBUMIN SERPL-MCNC: 4.4 G/DL (ref 3.5–5.2)
ALBUMIN/GLOB SERPL: 1.4 G/DL
ALP SERPL-CCNC: 98 U/L (ref 39–117)
ALT SERPL W P-5'-P-CCNC: 14 U/L (ref 1–33)
ANION GAP SERPL CALCULATED.3IONS-SCNC: 12 MMOL/L (ref 5–15)
AST SERPL-CCNC: 19 U/L (ref 1–32)
BACTERIA UR QL AUTO: NORMAL /HPF
BASOPHILS # BLD AUTO: 0.05 10*3/MM3 (ref 0–0.2)
BASOPHILS NFR BLD AUTO: 0.7 % (ref 0–1.5)
BILIRUB SERPL-MCNC: 0.3 MG/DL (ref 0–1.2)
BILIRUB UR QL STRIP: NEGATIVE
BUN SERPL-MCNC: 9 MG/DL (ref 6–20)
BUN/CREAT SERPL: 23.7 (ref 7–25)
C TRACH DNA SPEC QL NAA+PROBE: NOT DETECTED
CALCIUM SPEC-SCNC: 9.6 MG/DL (ref 8.6–10.5)
CHLORIDE SERPL-SCNC: 103 MMOL/L (ref 98–107)
CHOLEST SERPL-MCNC: 205 MG/DL (ref 0–200)
CLARITY UR: CLEAR
CO2 SERPL-SCNC: 23 MMOL/L (ref 22–29)
COLOR UR: YELLOW
CREAT SERPL-MCNC: 0.38 MG/DL (ref 0.57–1)
DEPRECATED RDW RBC AUTO: 38.3 FL (ref 37–54)
EGFRCR SERPLBLD CKD-EPI 2021: 143.7 ML/MIN/1.73
EOSINOPHIL # BLD AUTO: 0.2 10*3/MM3 (ref 0–0.4)
EOSINOPHIL NFR BLD AUTO: 2.8 % (ref 0.3–6.2)
ERYTHROCYTE [DISTWIDTH] IN BLOOD BY AUTOMATED COUNT: 12.4 % (ref 12.3–15.4)
GLOBULIN UR ELPH-MCNC: 3.2 GM/DL
GLUCOSE SERPL-MCNC: 87 MG/DL (ref 65–99)
GLUCOSE UR STRIP-MCNC: NEGATIVE MG/DL
HBA1C MFR BLD: 4.3 % (ref 4.8–5.6)
HCT VFR BLD AUTO: 38.7 % (ref 34–46.6)
HDLC SERPL-MCNC: 48 MG/DL (ref 40–60)
HGB BLD-MCNC: 13.1 G/DL (ref 12–15.9)
HGB UR QL STRIP.AUTO: NEGATIVE
HYALINE CASTS UR QL AUTO: NORMAL /LPF
IMM GRANULOCYTES # BLD AUTO: 0.02 10*3/MM3 (ref 0–0.05)
IMM GRANULOCYTES NFR BLD AUTO: 0.3 % (ref 0–0.5)
KETONES UR QL STRIP: NEGATIVE
LDLC SERPL CALC-MCNC: 148 MG/DL (ref 0–100)
LDLC/HDLC SERPL: 3.06 {RATIO}
LEUKOCYTE ESTERASE UR QL STRIP.AUTO: NEGATIVE
LYMPHOCYTES # BLD AUTO: 1.63 10*3/MM3 (ref 0.7–3.1)
LYMPHOCYTES NFR BLD AUTO: 22.6 % (ref 19.6–45.3)
MCH RBC QN AUTO: 29 PG (ref 26.6–33)
MCHC RBC AUTO-ENTMCNC: 33.9 G/DL (ref 31.5–35.7)
MCV RBC AUTO: 85.8 FL (ref 79–97)
MONOCYTES # BLD AUTO: 0.43 10*3/MM3 (ref 0.1–0.9)
MONOCYTES NFR BLD AUTO: 6 % (ref 5–12)
N GONORRHOEA RRNA SPEC QL NAA+PROBE: NOT DETECTED
NEUTROPHILS NFR BLD AUTO: 4.87 10*3/MM3 (ref 1.7–7)
NEUTROPHILS NFR BLD AUTO: 67.6 % (ref 42.7–76)
NITRITE UR QL STRIP: NEGATIVE
NRBC BLD AUTO-RTO: 0 /100 WBC (ref 0–0.2)
PH UR STRIP.AUTO: 6.5 [PH] (ref 5–8)
PLATELET # BLD AUTO: 249 10*3/MM3 (ref 140–450)
PMV BLD AUTO: 11.1 FL (ref 6–12)
POTASSIUM SERPL-SCNC: 3.8 MMOL/L (ref 3.5–5.2)
PROT SERPL-MCNC: 7.6 G/DL (ref 6–8.5)
PROT UR QL STRIP: ABNORMAL
RBC # BLD AUTO: 4.51 10*6/MM3 (ref 3.77–5.28)
RBC # UR STRIP: NORMAL /HPF
REF LAB TEST METHOD: NORMAL
SODIUM SERPL-SCNC: 138 MMOL/L (ref 136–145)
SP GR UR STRIP: 1.03 (ref 1–1.03)
SQUAMOUS #/AREA URNS HPF: NORMAL /HPF
TRIGL SERPL-MCNC: 51 MG/DL (ref 0–150)
TSH SERPL DL<=0.05 MIU/L-ACNC: 3.69 UIU/ML (ref 0.27–4.2)
UROBILINOGEN UR QL STRIP: ABNORMAL
VLDLC SERPL-MCNC: 9 MG/DL (ref 5–40)
WBC # UR STRIP: NORMAL /HPF
WBC NRBC COR # BLD AUTO: 7.2 10*3/MM3 (ref 3.4–10.8)

## 2025-06-04 PROCEDURE — 84443 ASSAY THYROID STIM HORMONE: CPT | Performed by: STUDENT IN AN ORGANIZED HEALTH CARE EDUCATION/TRAINING PROGRAM

## 2025-06-04 PROCEDURE — 99395 PREV VISIT EST AGE 18-39: CPT | Performed by: STUDENT IN AN ORGANIZED HEALTH CARE EDUCATION/TRAINING PROGRAM

## 2025-06-04 PROCEDURE — 80053 COMPREHEN METABOLIC PANEL: CPT | Performed by: STUDENT IN AN ORGANIZED HEALTH CARE EDUCATION/TRAINING PROGRAM

## 2025-06-04 PROCEDURE — 85025 COMPLETE CBC W/AUTO DIFF WBC: CPT | Performed by: STUDENT IN AN ORGANIZED HEALTH CARE EDUCATION/TRAINING PROGRAM

## 2025-06-04 PROCEDURE — 81001 URINALYSIS AUTO W/SCOPE: CPT | Performed by: STUDENT IN AN ORGANIZED HEALTH CARE EDUCATION/TRAINING PROGRAM

## 2025-06-04 PROCEDURE — 83036 HEMOGLOBIN GLYCOSYLATED A1C: CPT | Performed by: STUDENT IN AN ORGANIZED HEALTH CARE EDUCATION/TRAINING PROGRAM

## 2025-06-04 PROCEDURE — 87491 CHLMYD TRACH DNA AMP PROBE: CPT | Performed by: STUDENT IN AN ORGANIZED HEALTH CARE EDUCATION/TRAINING PROGRAM

## 2025-06-04 PROCEDURE — 36415 COLL VENOUS BLD VENIPUNCTURE: CPT | Performed by: STUDENT IN AN ORGANIZED HEALTH CARE EDUCATION/TRAINING PROGRAM

## 2025-06-04 PROCEDURE — 80061 LIPID PANEL: CPT | Performed by: STUDENT IN AN ORGANIZED HEALTH CARE EDUCATION/TRAINING PROGRAM

## 2025-06-04 PROCEDURE — 87591 N.GONORRHOEAE DNA AMP PROB: CPT | Performed by: STUDENT IN AN ORGANIZED HEALTH CARE EDUCATION/TRAINING PROGRAM

## 2025-06-04 NOTE — PROGRESS NOTES
Adult Female Preventive Health Visit  DOS: 25    Patient: Maryam Alvarez  :  2000  Age: 24 y.o.  Gender: female   MRN: [id number]    Chief Complaint:   Annual Health Maintenance  Establish Care    Subjective   Patient is here for annual physical. Other complaints are none    History of Present Illness  The patient presents for an annual physical exam.    She has discontinued the use of Pristiq, a medication previously prescribed for health anxiety, approximately 1.5 months ago. She reports no current symptoms of depression or anxiety. She is not currently on any form of birth control and has never been pregnant. She maintains regular dental check-ups and had a Pap smear in , with the next one scheduled for . She reports no abnormal vaginal discharge, itching, or irritation. She takes hydroxyzine occasionally for itching.    She expresses a desire to lose weight, acknowledging that her exercise routine is insufficient and her diet could be improved. She plans to increase her physical activity and improve her dietary habits.    Her menstrual cycles are irregular, occurring every 2.5 to 3 months, and lasting between 5 to 7 days. She was prescribed birth control at the age of 16 to regulate her menstrual cycle, which she continued until approximately 6 months ago. Since discontinuing the birth control, she has had 2 menstrual cycles.       Allergies:   Allergies   Allergen Reactions    Zoloft [Sertraline Hcl] Itching     Medications:  Current Outpatient Medications on File Prior to Visit   Medication Sig Dispense Refill    hydrocortisone 2.5 % ointment Apply 1 Application topically to the appropriate area as directed 2 (Two) Times a Day. 20 g 2    hydrOXYzine (ATARAX) 25 MG tablet TAKE 1 TABLET BY MOUTH THREE TIMES A DAY AS NEEDED FOR ITCHING 90 tablet 0    [DISCONTINUED] desvenlafaxine (PRISTIQ) 50 MG 24 hr tablet Take 1 tablet by mouth Daily. (Patient not taking: Reported on 2025) 90  tablet 1    [DISCONTINUED] Hydrocortisone, Perianal, (ANUSOL-HC) 2.5 % rectal cream Insert  into the rectum 2 (Two) Times a Day. (Patient not taking: Reported on 6/4/2025) 28 g 1     No current facility-administered medications on file prior to visit.      I have reviewed and updated as appropriate the past medical, surgical, family, and social history as summarized below:  Past Medical, Social and Family History:     Past Medical History:   Diagnosis Date    Anxiety     Breast mass, right      Social History     Tobacco Use    Smoking status: Never    Smokeless tobacco: Never   Substance Use Topics    Alcohol use: No     Family History   Problem Relation Age of Onset    No Known Problems Mother     No Known Problems Father     Multiple sclerosis Maternal Grandmother     Meniere's disease Maternal Grandmother     No Known Problems Maternal Grandfather     No Known Problems Paternal Grandmother     No Known Problems Paternal Grandfather     Colon cancer Neg Hx      Immunization History   Administered Date(s) Administered    31-influenza Vac Quardvalent Preservativ 10/05/2020    COVID-19 (PFIZER) 12YRS+ (COMIRNATY) 11/14/2023    COVID-19 (PFIZER) Purple Cap Monovalent 04/12/2021, 05/03/2021    Covid-19 (Pfizer) Gray Cap Monovalent 02/14/2022    DTaP 2000, 03/09/2001, 05/21/2001, 11/12/2004    Fluzone (or Fluarix & Flulaval for VFC) >6mos 11/14/2023    Hep A, 2 Dose 03/14/2018, 09/20/2018    Hep B, Adolescent or Pediatric 2000, 05/21/2001, 07/15/2001    Hib (PRP-T) 2000, 03/09/2001, 05/21/2001, 07/11/2002    Hpv9 01/06/2020, 01/28/2021, 05/24/2021    IPV 2000, 03/09/2001, 07/11/2002, 11/12/2004    MMR 01/15/2002, 11/12/2004    Meningococcal MCV4P (Menactra) 05/14/2012, 08/02/2018    Pneumococcal Conjugate 13-Valent (PCV13) 2000, 03/09/2001, 05/21/2001, 01/15/2002    Tdap 05/14/2012, 05/02/2022    Varicella 07/11/2002, 05/14/2012         Objective   Vital Signs:   Vitals:    06/04/25 0741  "  BP: 119/81   BP Location: Left arm   Patient Position: Sitting   Cuff Size: Adult   Pulse: 100   Temp: 97.8 °F (36.6 °C)   TempSrc: Temporal   SpO2: 99%   Weight: 103 kg (227 lb)   Height: 167.6 cm (65.98\")     Body mass index is 36.66 kg/m².    Wt Readings from Last 3 Encounters:   06/04/25 103 kg (227 lb)   01/27/25 104 kg (230 lb 6.1 oz)   11/06/24 103 kg (226 lb 9.6 oz)     BP Readings from Last 3 Encounters:   06/04/25 119/81   01/27/25 105/58   11/06/24 136/76       Review of Systems    Physical Exam  Constitutional:       Appearance: Normal appearance.   HENT:      Head: Normocephalic and atraumatic.      Right Ear: Tympanic membrane normal.      Left Ear: Tympanic membrane normal.      Mouth/Throat:      Mouth: Mucous membranes are moist.   Eyes:      Pupils: Pupils are equal, round, and reactive to light.   Cardiovascular:      Rate and Rhythm: Normal rate and regular rhythm.      Pulses: Normal pulses.      Heart sounds: Normal heart sounds.   Pulmonary:      Effort: Pulmonary effort is normal.      Breath sounds: Normal breath sounds. No stridor. No wheezing.   Abdominal:      Palpations: Abdomen is soft.      Tenderness: There is no abdominal tenderness.      Hernia: No hernia is present.   Musculoskeletal:         General: No swelling or tenderness.   Skin:     General: Skin is warm.   Neurological:      General: No focal deficit present.      Mental Status: She is alert and oriented to person, place, and time.   Psychiatric:         Mood and Affect: Mood normal.         Behavior: Behavior normal.         Physical Exam  Ears: External ear canals and tympanic membranes intact  Mouth/Throat: Mucous membranes moist, no erythema, no exudate  Respiratory: Clear to auscultation, no wheezing, rales or rhonchi  Gastrointestinal: Soft, no tenderness, no distention, no masses  Extremities: No edema, no cyanosis  Genitourinary: Normal external appearance, no masses, lesions, or discharge  Rectal: Normal " sphincter tone, no masses, no external hemorrhoids       Health Maintenance   Topic Date Due    CHLAMYDIA SCREENING  04/11/2023    COVID-19 Vaccine (5 - 2024-25 season) 06/18/2025 (Originally 9/1/2024)    INFLUENZA VACCINE  07/01/2025    ANNUAL PHYSICAL  06/04/2026    PAP SMEAR  02/12/2027    TDAP/TD VACCINES (3 - Td or Tdap) 05/02/2032    HEPATITIS C SCREENING  Completed    Pneumococcal Vaccine 0-49  Completed    HPV VACCINES  Completed    MENINGOCOCCAL B VACCINE  Aged Out       Lifestyle:  Marital Status: engaged  Household members: domestic partner   Work Status: employed  Weight Concerns: Yes  Balanced diet: overall good  Regular Exercise: No    Class 2 Severe Obesity (BMI >=35 and <=39.9). Obesity-related health conditions include the following: obstructive sleep apnea, hypertension, and coronary heart disease. Obesity is unchanged. BMI is is above average; BMI management plan is completed. We discussed portion control and increasing exercise.      Fall Risk  STEADI Fall Risk Assessment has not been completed.    Social Screening Questions:  Maryam Alvarez  reports that she has never smoked. She has never used smokeless tobacco.    Drug Use: denied.  Alcohol Use: none  Sexually Active: Yes  Sexual Preference: heterosexual  Contraception:  no method    PHQ-9 Depression Screening  Little interest or pleasure in doing things? Not at all   Feeling down, depressed, or hopeless? Not at all   PHQ-2 Total Score 0   Trouble falling or staying asleep, or sleeping too much?     Feeling tired or having little energy?     Poor appetite or overeating?     Feeling bad about yourself - or that you are a failure or have let yourself or your family down?     Trouble concentrating on things, such as reading the newspaper or watching television?     Moving or speaking so slowly that other people could have noticed? Or the opposite - being so fidgety or restless that you have been moving around a lot more than usual?      Thoughts that you would be better off dead, or of hurting yourself in some way?     PHQ-9 Total Score     If you checked off any problems, how difficult have these problems made it for you to do your work, take care of things at home, or get along with other people? Not difficult at all       Vision/Hearing/Dental  Regular Dental Visits: Yes  Vision Problems: No  Hearing Loss: No    Health Screening  Breast Cancer Screening:   Last Completed Mammogram    This patient has no relevant Health Maintenance data.       Pap Smear:   Last Completed Pap Smear            Upcoming       PAP SMEAR (Every 3 Years) Next due on 2024  IGP,rfx Aptima HPV All Pth    2022  IGP,CtNgTv,rfx Aptima HPV ASCU    2022  Done - normal                          Colon Cancer Screening:   Last Completed Colonoscopy    This patient has no relevant Health Maintenance data.       Breast cancer screening: N/A  Pap smear:up to date  Colon Screening Methold: Colonoscopy, normal  Last DEXA: N/A  Lung Cancer Screening: N/A    Reproductive Health  Pregnancy History:    0   Para 0   AB Spont 0   AB Elective 0  Menstrual history: irregular menses    Safety  Smoke Detectors in Home: Yes  Carbon Monoxide Detectors in Home: Yes  Seatbelt use: Yes  Sunscreen Use: Yes    Results    Diagnostic Testing   - Colonoscopy in : Perianal and digital rectal examination normal. Entire examination of colon normal. Ileum normal. No specimens collected.       Assessment   Diagnoses and all orders for this visit:    1. Annual physical exam (Primary)  -     CBC & Differential; Future  -     Comprehensive Metabolic Panel  -     Hemoglobin A1c; Future  -     Lipid Panel  -     TSH Rfx On Abnormal To Free T4  -     Urinalysis With Microscopic - Urine, Clean Catch  -     Chlamydia trachomatis, Neisseria gonorrhoeae, PCR - Urine, Urine, Clean Catch        Assessment & Plan  1. Health maintenance.  - Weight has remained stable, with no  significant loss observed.  - Blood pressure readings are within the normal range. Colonoscopy results were unremarkable, indicating a healthy colon. Pap smear conducted in 2024 yielded normal results.  - Up-to-date with vaccinations. Comprehensive blood work will be ordered today, including CBC, hemoglobin, CMP, hemoglobin A1c, cholesterol levels, thyroid function, and urine analysis for infection and proteinuria. Chlamydia screening will also be conducted as part of routine screening     2. Irregular menstrual cycles.  - Reports irregular menstrual cycles, occurring every 2.5 to 3 months, with periods lasting 5 to 7 days.  - Previously on birth control to regulate cycles but discontinued it about six months ago. Since then, has had two periods.  - Advised that weight loss might help in regulating menstrual cycles over time.          Return in about 1 year (around 6/4/2026).    Patient Care Team:  Meghan العلي MD as PCP - General (Family Medicine)    Electronically signed by Meghan العلي MD, 06/04/25, 7:54 AM EDT.    Patient or patient representative verbalized consent for the use of Ambient Listening during the visit with  Meghan العلي MD for chart documentation. 6/4/2025  08:01 EDT

## 2025-06-24 DIAGNOSIS — F41.1 GAD (GENERALIZED ANXIETY DISORDER): Primary | ICD-10-CM

## 2025-06-24 RX ORDER — BUSPIRONE HYDROCHLORIDE 10 MG/1
10 TABLET ORAL 2 TIMES DAILY PRN
Qty: 90 TABLET | Refills: 1 | Status: SHIPPED | OUTPATIENT
Start: 2025-06-24

## 2025-07-17 DIAGNOSIS — F41.1 GAD (GENERALIZED ANXIETY DISORDER): Primary | ICD-10-CM

## 2025-07-17 RX ORDER — VENLAFAXINE HYDROCHLORIDE 37.5 MG/1
37.5 CAPSULE, EXTENDED RELEASE ORAL DAILY
Qty: 60 CAPSULE | Refills: 0 | Status: SHIPPED | OUTPATIENT
Start: 2025-07-17

## 2025-07-21 ENCOUNTER — OFFICE VISIT (OUTPATIENT)
Dept: FAMILY MEDICINE CLINIC | Facility: CLINIC | Age: 25
End: 2025-07-21
Payer: COMMERCIAL

## 2025-07-21 ENCOUNTER — HOSPITAL ENCOUNTER (OUTPATIENT)
Dept: GENERAL RADIOLOGY | Facility: HOSPITAL | Age: 25
Discharge: HOME | End: 2025-07-21
Payer: COMMERCIAL

## 2025-07-21 VITALS
DIASTOLIC BLOOD PRESSURE: 79 MMHG | OXYGEN SATURATION: 99 % | HEIGHT: 66 IN | WEIGHT: 227.2 LBS | TEMPERATURE: 98 F | SYSTOLIC BLOOD PRESSURE: 115 MMHG | HEART RATE: 89 BPM | BODY MASS INDEX: 36.52 KG/M2

## 2025-07-21 DIAGNOSIS — R59.0 SUPRACLAVICULAR LYMPHADENOPATHY: ICD-10-CM

## 2025-07-21 DIAGNOSIS — M25.511 ARTHRALGIA OF RIGHT ACROMIOCLAVICULAR JOINT: Primary | ICD-10-CM

## 2025-07-21 PROCEDURE — 71046 X-RAY EXAM CHEST 2 VIEWS: CPT

## 2025-07-21 PROCEDURE — 99214 OFFICE O/P EST MOD 30 MIN: CPT | Performed by: NURSE PRACTITIONER

## 2025-07-21 PROCEDURE — 73000 X-RAY EXAM OF COLLAR BONE: CPT

## 2025-07-21 RX ORDER — MOMETASONE FUROATE 1 MG/ML
LOTION TOPICAL
COMMUNITY
Start: 2025-07-08

## 2025-07-21 RX ORDER — BUSPIRONE HYDROCHLORIDE 10 MG/1
5 TABLET ORAL DAILY
COMMUNITY

## 2025-07-21 NOTE — LETTER
July 21, 2025     Patient: Maryam Alvarez   YOB: 2000   Date of Visit: 7/21/2025       To Whom It May Concern:    It is my medical opinion that Maryam Alvarez may return to work on 7/22/25.           Sincerely,        DEEPTI Majano

## 2025-07-21 NOTE — PROGRESS NOTES
Chief Complaint  Pain (Has pain near right collar bone frequently it comes and goes a lot. )    Subjective          Maryam Alvarez is a 24 y.o. female who presents to [facility]    History of Present Illness  History of Present Illness  The patient presents for evaluation of clavicle pain.    She reports experiencing a dull pain in her clavicle, which she rates as 2 out of 10. The pain is not constant but is more noticeable upon waking up and occasionally throughout the day. It does not worsen after eating or when lying down. The pain has been present for several months without any escalation. She also mentions that the pain sometimes radiates downwards but does not extend to the collar bone. She is uncertain if the pain is located above or below the bone. She has no history of a broken clavicle. She reports no dental issues, sore throat, or ear pain. She recently underwent a root canal procedure. She has been experiencing nasal congestion recently but reports no significant symptoms. She is not pregnant and has not experienced any intermittent fevers. She reports no bloating or gas and maintains regular bowel movements.    She has a history of health anxiety, which intensifies her concern over any physical discomfort. She has had a swollen lymph node in her neck for several years, which has been evaluated by an ENT specialist and found to be non-concerning. However, she remains anxious about it.    PAST SURGICAL HISTORY:  Ultrasound of the head and neck in 04/2022      The PHQ has not been completed during this encounter.               Health Maintenance Due   Topic Date Due   • COVID-19 Vaccine (5 - 2024-25 season) 09/01/2024        Review of Systems   Constitutional:  Negative for chills, fatigue and fever.   Respiratory:  Negative for cough and shortness of breath.    Cardiovascular:  Negative for chest pain and palpitations.   Gastrointestinal:  Negative for constipation,  diarrhea, nausea and vomiting.   Musculoskeletal:  Negative for back pain and neck pain.   Skin:  Negative for rash.   Neurological:  Negative for dizziness and headaches.          Medical History: has a past medical history of Anxiety and Breast mass, right.     Surgical History: has a past surgical history that includes [place] tooth extraction and Colonoscopy (N/A, 1/27/2025).     Family History: family history includes Meniere's disease in her maternal grandmother; Multiple sclerosis in her maternal grandmother; No Known Problems in her father, maternal grandfather, mother, paternal grandfather, and paternal grandmother.     Social History: reports that she has never smoked. She has never used smokeless tobacco. She reports that she does not drink alcohol and does not use drugs.    Allergies: Zoloft [sertraline hcl]      Current Outpatient Medications:   •  busPIRone (BUSPAR) 10 MG tablet, Take 0.5 tablets by mouth Daily. Patient is currently in process of weaning off medication, Disp: , Rfl:   •  hydrocortisone 2.5 % ointment, Apply 1 Application topically to the appropriate area as directed 2 (Two) Times a Day., Disp: 20 g, Rfl: 2  •  mometasone (ELOCON) 0.1 % solution, APPLY TO AFFECTED AREAS ON THE BODY TWICE A DAY UNTIL CLEAR., Disp: , Rfl:   •  venlafaxine XR (Effexor XR) 37.5 MG 24 hr capsule, Take 1 capsule by mouth Daily. (Patient not taking: Reported on 7/21/2025), Disp: 60 capsule, Rfl: 0      Immunization History   Administered Date(s) Administered   • 31-influenza Vac Quardvalent Preservativ 10/05/2020   • COVID-19 (PFIZER) 12YRS+ (COMIRNATY) 11/14/2023   • COVID-19 (PFIZER) Purple Cap Monovalent 04/12/2021, 05/03/2021   • Covid-19 (Pfizer) Gray Cap Monovalent 02/14/2022   • DTaP 2000, 03/09/2001, 05/21/2001, 11/12/2004   • Fluzone (or Fluarix & Flulaval for VFC) >6mos 11/14/2023   • Hep A, 2 Dose 03/14/2018, 09/20/2018   • Hep B, Adolescent or Pediatric 2000, 05/21/2001, 07/15/2001   •  "Hib (PRP-T) 2000, 03/09/2001, 05/21/2001, 07/11/2002   • Hpv9 01/06/2020, 01/28/2021, 05/24/2021   • IPV 2000, 03/09/2001, 07/11/2002, 11/12/2004   • MMR 01/15/2002, 11/12/2004   • Meningococcal MCV4P (Menactra) 05/14/2012, 08/02/2018   • Pneumococcal Conjugate 13-Valent (PCV13) 2000, 03/09/2001, 05/21/2001, 01/15/2002   • Tdap 05/14/2012, 05/02/2022   • Varicella 07/11/2002, 05/14/2012         Objective       Vitals:    07/21/25 1014   BP: 115/79   Pulse: 89   Temp: 98 °F (36.7 °C)   TempSrc: Temporal   SpO2: 99%   Weight: 103 kg (227 lb 3.2 oz)   Height: 167.6 cm (65.98\")      Body mass index is 36.69 kg/m².   Wt Readings from Last 3 Encounters:   07/21/25 103 kg (227 lb 3.2 oz)   06/04/25 103 kg (227 lb)   01/27/25 104 kg (230 lb 6.1 oz)      BP Readings from Last 3 Encounters:   07/21/25 115/79   06/04/25 119/81   01/27/25 105/58             Physical Exam  Vitals reviewed.   Constitutional:       Appearance: Normal appearance. She is well-developed.   HENT:      Head: Normocephalic and atraumatic.   Eyes:      Conjunctiva/sclera: Conjunctivae normal.      Pupils: Pupils are equal, round, and reactive to light.   Cardiovascular:      Rate and Rhythm: Normal rate and regular rhythm.      Heart sounds: Normal heart sounds. No murmur heard.  Pulmonary:      Effort: Pulmonary effort is normal.      Breath sounds: Normal breath sounds. No wheezing or rhonchi.   Chest:          Comments: Tenderness to palpation.     Abdominal:      General: Bowel sounds are normal. There is no distension.      Palpations: Abdomen is soft.      Tenderness: There is no abdominal tenderness.   Lymphadenopathy:      Cervical: Cervical adenopathy present.      Upper Body:      Right upper body: Supraclavicular adenopathy present.      Left upper body: No supraclavicular adenopathy.      Comments: 1 cm lymphadenopathy supraclavicular region.      Skin:     General: Skin is warm and dry.   Neurological:      Mental Status: " She is alert and oriented to person, place, and time.   Psychiatric:         Mood and Affect: Mood and affect normal.         Behavior: Behavior normal.         Thought Content: Thought content normal.         Judgment: Judgment normal.       Physical Exam  Ears: Tympanic membrane is in good condition.        Result Review :   Results  Imaging   - Ultrasound of the head and neck: 04/2022, Noted two lymph nodes on the right supraclavicular area         Common labs          8/12/2024    12:28 6/4/2025    08:15   Common Labs   Glucose 73  87    BUN 8  9.0    Creatinine 0.64  0.38    Sodium 137  138    Potassium 4.0  3.8    Chloride 101  103    Calcium 9.8  9.6    Albumin 4.3  4.4    Total Bilirubin 0.3  0.3    Alkaline Phosphatase 86  98    AST (SGOT) 24  19    ALT (SGPT) 15  14    WBC 7.55  7.20    Hemoglobin 13.1  13.1    Hematocrit 40.4  38.7    Platelets 209  249    Total Cholesterol 239  205    Triglycerides 79  51    HDL Cholesterol 70  48    LDL Cholesterol  155  148    Hemoglobin A1C  4.30                     Assessment and Plan        Diagnoses and all orders for this visit:    1. Arthralgia of right acromioclavicular joint (Primary)    2. Supraclavicular lymphadenopathy  -     XR Clavicle Right; Future  -     XR Chest 2 View; Future  -     CT Soft Tissue Neck With & Without Contrast; Future        Assessment & Plan  1. Clavicle pain.  - Symptoms include a dull pain rated 2 out of 10, localized to the clavicle area, more noticeable upon waking up.  - Physical exam reveals tenderness in the clavicle area, no signs of dental issues, sore throat, or ear pain.  - Discussed the possibility of arthritis or other bone-related issues; no history of a broken clavicle.  - Ordered an x-ray of the clavicle to investigate further.    2. Supraclavicular lymphadenopathy.  - History of a swollen lymph node in the right supraclavicular area, stable for years.  - Previous ultrasound in 04/2022 showed stable lymph nodes,  reducing the likelihood of malignancy.  - Discussed the need for further evaluation with a chest x-ray and a CT scan of the neck with and without contrast.  - Ordered a chest x-ray and a CT scan of the neck; patient can cancel the CT scan if desired when contacted in 3-5 business days.        Patient was given instructions and counseling regarding her condition or for health maintenance advice. Please see specific information pulled into the AVS if appropriate.     DEEPTI Majano    Patient or patient representative verbalized consent for the use of Ambient Listening during the visit with  DEEPTI Majano for chart documentation. 7/21/2025  10:41 EDT

## 2025-08-14 DIAGNOSIS — F41.1 GAD (GENERALIZED ANXIETY DISORDER): ICD-10-CM

## 2025-08-15 RX ORDER — VENLAFAXINE HYDROCHLORIDE 37.5 MG/1
37.5 CAPSULE, EXTENDED RELEASE ORAL DAILY
Qty: 90 CAPSULE | Refills: 1 | Status: SHIPPED | OUTPATIENT
Start: 2025-08-15

## (undated) DEVICE — Device: Brand: DEFENDO AIR/WATER/SUCTION AND BIOPSY VALVE

## (undated) DEVICE — SOLIDIFIER LIQLOC PLS 1500CC BT

## (undated) DEVICE — Device

## (undated) DEVICE — THE STERILE LIGHT HANDLE COVER IS USED WITH STERIS SURGICAL LIGHTING AND VISUALIZATION SYSTEMS.

## (undated) DEVICE — SOL IRRG H2O PL/BG 1000ML STRL